# Patient Record
Sex: FEMALE | Race: WHITE | NOT HISPANIC OR LATINO | Employment: OTHER | URBAN - METROPOLITAN AREA
[De-identification: names, ages, dates, MRNs, and addresses within clinical notes are randomized per-mention and may not be internally consistent; named-entity substitution may affect disease eponyms.]

---

## 2018-08-15 ENCOUNTER — HOSPITAL ENCOUNTER (INPATIENT)
Facility: HOSPITAL | Age: 58
LOS: 4 days | Discharge: HOME | End: 2018-08-19
Attending: EMERGENCY MEDICINE | Admitting: HOSPITALIST

## 2018-08-15 ENCOUNTER — APPOINTMENT (OUTPATIENT)
Dept: CARDIOLOGY | Facility: HOSPITAL | Age: 58
End: 2018-08-15

## 2018-08-15 DIAGNOSIS — I73.9 PVD (PERIPHERAL VASCULAR DISEASE) (HCC): ICD-10-CM

## 2018-08-15 DIAGNOSIS — L03.115 CELLULITIS OF RIGHT LOWER EXTREMITY: Primary | ICD-10-CM

## 2018-08-15 PROBLEM — J44.9 COPD (CHRONIC OBSTRUCTIVE PULMONARY DISEASE) (HCC): Status: ACTIVE | Noted: 2018-08-15

## 2018-08-15 PROBLEM — E78.5 HYPERLIPIDEMIA: Status: ACTIVE | Noted: 2018-08-15

## 2018-08-15 PROBLEM — I10 HYPERTENSION: Status: ACTIVE | Noted: 2018-08-15

## 2018-08-15 PROBLEM — I25.10 CORONARY ARTERY DISEASE: Status: ACTIVE | Noted: 2018-08-15

## 2018-08-15 PROBLEM — F17.200 SMOKER: Status: ACTIVE | Noted: 2018-08-15

## 2018-08-15 PROBLEM — E11.9 DIABETES MELLITUS (HCC): Status: ACTIVE | Noted: 2018-08-15

## 2018-08-15 LAB
ALBUMIN SERPL-MCNC: 3.96 G/DL (ref 3.2–4.8)
ALBUMIN/GLOB SERPL: 1.3 G/DL (ref 1.5–2.5)
ALP SERPL-CCNC: 106 U/L (ref 25–100)
ALT SERPL W P-5'-P-CCNC: 17 U/L (ref 7–40)
ANION GAP SERPL CALCULATED.3IONS-SCNC: 11 MMOL/L (ref 3–11)
AST SERPL-CCNC: 12 U/L (ref 0–33)
BASOPHILS # BLD AUTO: 0.02 10*3/MM3 (ref 0–0.2)
BASOPHILS NFR BLD AUTO: 0.2 % (ref 0–1)
BH CV LOWER VASCULAR LEFT COMMON FEMORAL AUGMENT: NORMAL
BH CV LOWER VASCULAR LEFT COMMON FEMORAL COMPRESS: NORMAL
BH CV LOWER VASCULAR LEFT COMMON FEMORAL PHASIC: NORMAL
BH CV LOWER VASCULAR LEFT COMMON FEMORAL SPONT: NORMAL
BH CV LOWER VASCULAR RIGHT COMMON FEMORAL AUGMENT: NORMAL
BH CV LOWER VASCULAR RIGHT COMMON FEMORAL COMPRESS: NORMAL
BH CV LOWER VASCULAR RIGHT COMMON FEMORAL PHASIC: NORMAL
BH CV LOWER VASCULAR RIGHT COMMON FEMORAL SPONT: NORMAL
BH CV LOWER VASCULAR RIGHT DISTAL FEMORAL COMPRESS: NORMAL
BH CV LOWER VASCULAR RIGHT GASTRONEMIUS COMPRESS: NORMAL
BH CV LOWER VASCULAR RIGHT GREATER SAPH AK COMPRESS: NORMAL
BH CV LOWER VASCULAR RIGHT LESSER SAPH COMPRESS: NORMAL
BH CV LOWER VASCULAR RIGHT MID FEMORAL AUGMENT: NORMAL
BH CV LOWER VASCULAR RIGHT MID FEMORAL COMPRESS: NORMAL
BH CV LOWER VASCULAR RIGHT MID FEMORAL PHASIC: NORMAL
BH CV LOWER VASCULAR RIGHT MID FEMORAL SPONT: NORMAL
BH CV LOWER VASCULAR RIGHT PERONEAL COMPRESS: NORMAL
BH CV LOWER VASCULAR RIGHT POPLITEAL AUGMENT: NORMAL
BH CV LOWER VASCULAR RIGHT POPLITEAL COMPRESS: NORMAL
BH CV LOWER VASCULAR RIGHT POPLITEAL PHASIC: NORMAL
BH CV LOWER VASCULAR RIGHT POPLITEAL SPONT: NORMAL
BH CV LOWER VASCULAR RIGHT POSTERIOR TIBIAL COMPRESS: NORMAL
BH CV LOWER VASCULAR RIGHT PROXIMAL FEMORAL COMPRESS: NORMAL
BH CV LOWER VASCULAR RIGHT SAPHENOFEMORAL JUNCTION AUGMENT: NORMAL
BH CV LOWER VASCULAR RIGHT SAPHENOFEMORAL JUNCTION COMPRESS: NORMAL
BH CV LOWER VASCULAR RIGHT SAPHENOFEMORAL JUNCTION PHASIC: NORMAL
BH CV LOWER VASCULAR RIGHT SAPHENOFEMORAL JUNCTION SPONT: NORMAL
BILIRUB SERPL-MCNC: 0.3 MG/DL (ref 0.3–1.2)
BUN BLD-MCNC: 11 MG/DL (ref 9–23)
BUN/CREAT SERPL: 14.1 (ref 7–25)
CALCIUM SPEC-SCNC: 8.6 MG/DL (ref 8.7–10.4)
CHLORIDE SERPL-SCNC: 101 MMOL/L (ref 99–109)
CO2 SERPL-SCNC: 26 MMOL/L (ref 20–31)
CREAT BLD-MCNC: 0.78 MG/DL (ref 0.6–1.3)
CRP SERPL-MCNC: 22.08 MG/DL (ref 0–1)
DEPRECATED RDW RBC AUTO: 45 FL (ref 37–54)
EOSINOPHIL # BLD AUTO: 0.04 10*3/MM3 (ref 0–0.3)
EOSINOPHIL NFR BLD AUTO: 0.3 % (ref 0–3)
ERYTHROCYTE [DISTWIDTH] IN BLOOD BY AUTOMATED COUNT: 14.4 % (ref 11.3–14.5)
ERYTHROCYTE [SEDIMENTATION RATE] IN BLOOD: 87 MM/HR (ref 0–30)
GFR SERPL CREATININE-BSD FRML MDRD: 76 ML/MIN/1.73
GLOBULIN UR ELPH-MCNC: 2.9 GM/DL
GLUCOSE BLD-MCNC: 178 MG/DL (ref 70–100)
GLUCOSE BLDC GLUCOMTR-MCNC: 226 MG/DL (ref 70–130)
HCT VFR BLD AUTO: 43.7 % (ref 34.5–44)
HGB BLD-MCNC: 13.5 G/DL (ref 11.5–15.5)
HOLD SPECIMEN: NORMAL
HOLD SPECIMEN: NORMAL
IMM GRANULOCYTES # BLD: 0.03 10*3/MM3 (ref 0–0.03)
IMM GRANULOCYTES NFR BLD: 0.3 % (ref 0–0.6)
LYMPHOCYTES # BLD AUTO: 0.92 10*3/MM3 (ref 0.6–4.8)
LYMPHOCYTES NFR BLD AUTO: 7.7 % (ref 24–44)
MCH RBC QN AUTO: 26.4 PG (ref 27–31)
MCHC RBC AUTO-ENTMCNC: 30.9 G/DL (ref 32–36)
MCV RBC AUTO: 85.5 FL (ref 80–99)
MONOCYTES # BLD AUTO: 0.75 10*3/MM3 (ref 0–1)
MONOCYTES NFR BLD AUTO: 6.3 % (ref 0–12)
NEUTROPHILS # BLD AUTO: 10.25 10*3/MM3 (ref 1.5–8.3)
NEUTROPHILS NFR BLD AUTO: 85.5 % (ref 41–71)
PLATELET # BLD AUTO: 234 10*3/MM3 (ref 150–450)
PMV BLD AUTO: 10 FL (ref 6–12)
POTASSIUM BLD-SCNC: 3.2 MMOL/L (ref 3.5–5.5)
PROT SERPL-MCNC: 6.9 G/DL (ref 5.7–8.2)
RBC # BLD AUTO: 5.11 10*6/MM3 (ref 3.89–5.14)
SODIUM BLD-SCNC: 138 MMOL/L (ref 132–146)
WBC NRBC COR # BLD: 11.98 10*3/MM3 (ref 3.5–10.8)
WHOLE BLOOD HOLD SPECIMEN: NORMAL
WHOLE BLOOD HOLD SPECIMEN: NORMAL

## 2018-08-15 PROCEDURE — 99284 EMERGENCY DEPT VISIT MOD MDM: CPT

## 2018-08-15 PROCEDURE — 82962 GLUCOSE BLOOD TEST: CPT

## 2018-08-15 PROCEDURE — 25010000002 VANCOMYCIN 10 G RECONSTITUTED SOLUTION: Performed by: PHYSICIAN ASSISTANT

## 2018-08-15 PROCEDURE — 94799 UNLISTED PULMONARY SVC/PX: CPT

## 2018-08-15 PROCEDURE — 85025 COMPLETE CBC W/AUTO DIFF WBC: CPT | Performed by: EMERGENCY MEDICINE

## 2018-08-15 PROCEDURE — G0378 HOSPITAL OBSERVATION PER HR: HCPCS

## 2018-08-15 PROCEDURE — 86140 C-REACTIVE PROTEIN: CPT | Performed by: EMERGENCY MEDICINE

## 2018-08-15 PROCEDURE — 80053 COMPREHEN METABOLIC PANEL: CPT | Performed by: EMERGENCY MEDICINE

## 2018-08-15 PROCEDURE — 99223 1ST HOSP IP/OBS HIGH 75: CPT | Performed by: FAMILY MEDICINE

## 2018-08-15 PROCEDURE — 94640 AIRWAY INHALATION TREATMENT: CPT

## 2018-08-15 PROCEDURE — 93971 EXTREMITY STUDY: CPT | Performed by: INTERNAL MEDICINE

## 2018-08-15 PROCEDURE — 25010000002 CEFTRIAXONE PER 250 MG: Performed by: PHYSICIAN ASSISTANT

## 2018-08-15 PROCEDURE — 94760 N-INVAS EAR/PLS OXIMETRY 1: CPT

## 2018-08-15 PROCEDURE — 93971 EXTREMITY STUDY: CPT

## 2018-08-15 RX ORDER — NALOXONE HCL 0.4 MG/ML
0.4 VIAL (ML) INJECTION
Status: DISCONTINUED | OUTPATIENT
Start: 2018-08-15 | End: 2018-08-19 | Stop reason: HOSPADM

## 2018-08-15 RX ORDER — SODIUM CHLORIDE 0.9 % (FLUSH) 0.9 %
10 SYRINGE (ML) INJECTION AS NEEDED
Status: DISCONTINUED | OUTPATIENT
Start: 2018-08-15 | End: 2018-08-19 | Stop reason: HOSPADM

## 2018-08-15 RX ORDER — PRAVASTATIN SODIUM 20 MG
20 TABLET ORAL DAILY
Status: ON HOLD | COMMUNITY
End: 2018-08-19

## 2018-08-15 RX ORDER — ASPIRIN 81 MG/1
81 TABLET, CHEWABLE ORAL DAILY
COMMUNITY
End: 2018-08-15

## 2018-08-15 RX ORDER — IPRATROPIUM BROMIDE AND ALBUTEROL SULFATE 2.5; .5 MG/3ML; MG/3ML
3 SOLUTION RESPIRATORY (INHALATION) ONCE
Status: COMPLETED | OUTPATIENT
Start: 2018-08-15 | End: 2018-08-15

## 2018-08-15 RX ORDER — IPRATROPIUM BROMIDE AND ALBUTEROL SULFATE 2.5; .5 MG/3ML; MG/3ML
3 SOLUTION RESPIRATORY (INHALATION) EVERY 4 HOURS PRN
COMMUNITY

## 2018-08-15 RX ORDER — LISINOPRIL 10 MG/1
10 TABLET ORAL DAILY
Status: DISCONTINUED | OUTPATIENT
Start: 2018-08-16 | End: 2018-08-19 | Stop reason: HOSPADM

## 2018-08-15 RX ORDER — CETIRIZINE HYDROCHLORIDE 10 MG/1
5 TABLET ORAL DAILY
Status: DISCONTINUED | OUTPATIENT
Start: 2018-08-16 | End: 2018-08-19 | Stop reason: HOSPADM

## 2018-08-15 RX ORDER — POTASSIUM CHLORIDE 7.45 MG/ML
10 INJECTION INTRAVENOUS
Status: DISCONTINUED | OUTPATIENT
Start: 2018-08-15 | End: 2018-08-19 | Stop reason: HOSPADM

## 2018-08-15 RX ORDER — OXYBUTYNIN CHLORIDE 5 MG/1
5 TABLET, EXTENDED RELEASE ORAL DAILY
Status: DISCONTINUED | OUTPATIENT
Start: 2018-08-16 | End: 2018-08-19 | Stop reason: HOSPADM

## 2018-08-15 RX ORDER — ASPIRIN 325 MG
325 TABLET ORAL DAILY
COMMUNITY
End: 2019-05-01

## 2018-08-15 RX ORDER — NICOTINE 21 MG/24HR
1 PATCH, TRANSDERMAL 24 HOURS TRANSDERMAL ONCE
Status: COMPLETED | OUTPATIENT
Start: 2018-08-15 | End: 2018-08-16

## 2018-08-15 RX ORDER — CEFTRIAXONE SODIUM 1 G/50ML
1 INJECTION, SOLUTION INTRAVENOUS ONCE
Status: COMPLETED | OUTPATIENT
Start: 2018-08-15 | End: 2018-08-15

## 2018-08-15 RX ORDER — BUDESONIDE AND FORMOTEROL FUMARATE DIHYDRATE 160; 4.5 UG/1; UG/1
2 AEROSOL RESPIRATORY (INHALATION)
Status: DISCONTINUED | OUTPATIENT
Start: 2018-08-15 | End: 2018-08-19 | Stop reason: HOSPADM

## 2018-08-15 RX ORDER — HYDROCHLOROTHIAZIDE 25 MG/1
25 TABLET ORAL DAILY
Status: ON HOLD | COMMUNITY
End: 2018-08-19

## 2018-08-15 RX ORDER — MORPHINE SULFATE 2 MG/ML
1 INJECTION, SOLUTION INTRAMUSCULAR; INTRAVENOUS EVERY 4 HOURS PRN
Status: DISCONTINUED | OUTPATIENT
Start: 2018-08-15 | End: 2018-08-19 | Stop reason: HOSPADM

## 2018-08-15 RX ORDER — NICOTINE 21 MG/24HR
1 PATCH, TRANSDERMAL 24 HOURS TRANSDERMAL
Status: DISCONTINUED | OUTPATIENT
Start: 2018-08-16 | End: 2018-08-19 | Stop reason: HOSPADM

## 2018-08-15 RX ORDER — SODIUM CHLORIDE 0.9 % (FLUSH) 0.9 %
1-10 SYRINGE (ML) INJECTION AS NEEDED
Status: DISCONTINUED | OUTPATIENT
Start: 2018-08-15 | End: 2018-08-19 | Stop reason: HOSPADM

## 2018-08-15 RX ORDER — ONDANSETRON 2 MG/ML
4 INJECTION INTRAMUSCULAR; INTRAVENOUS EVERY 6 HOURS PRN
Status: DISCONTINUED | OUTPATIENT
Start: 2018-08-15 | End: 2018-08-19 | Stop reason: HOSPADM

## 2018-08-15 RX ORDER — TRAMADOL HYDROCHLORIDE 50 MG/1
50 TABLET ORAL EVERY 6 HOURS PRN
Status: DISCONTINUED | OUTPATIENT
Start: 2018-08-15 | End: 2018-08-19 | Stop reason: HOSPADM

## 2018-08-15 RX ORDER — CEFTRIAXONE SODIUM 1 G/50ML
1 INJECTION, SOLUTION INTRAVENOUS EVERY 24 HOURS
Status: DISCONTINUED | OUTPATIENT
Start: 2018-08-16 | End: 2018-08-18 | Stop reason: DRUGHIGH

## 2018-08-15 RX ORDER — ASPIRIN 81 MG/1
81 TABLET, CHEWABLE ORAL DAILY
Status: DISCONTINUED | OUTPATIENT
Start: 2018-08-16 | End: 2018-08-19 | Stop reason: HOSPADM

## 2018-08-15 RX ORDER — ATORVASTATIN CALCIUM 10 MG/1
10 TABLET, FILM COATED ORAL NIGHTLY
Status: DISCONTINUED | OUTPATIENT
Start: 2018-08-15 | End: 2018-08-19 | Stop reason: HOSPADM

## 2018-08-15 RX ORDER — POTASSIUM CHLORIDE 1.5 G/1.77G
40 POWDER, FOR SOLUTION ORAL AS NEEDED
Status: DISCONTINUED | OUTPATIENT
Start: 2018-08-15 | End: 2018-08-19 | Stop reason: HOSPADM

## 2018-08-15 RX ORDER — HYDROCHLOROTHIAZIDE 25 MG/1
25 TABLET ORAL DAILY
Status: DISCONTINUED | OUTPATIENT
Start: 2018-08-16 | End: 2018-08-19 | Stop reason: HOSPADM

## 2018-08-15 RX ORDER — DEXTROSE MONOHYDRATE 25 G/50ML
25 INJECTION, SOLUTION INTRAVENOUS
Status: DISCONTINUED | OUTPATIENT
Start: 2018-08-15 | End: 2018-08-19 | Stop reason: HOSPADM

## 2018-08-15 RX ORDER — POTASSIUM CHLORIDE 750 MG/1
40 CAPSULE, EXTENDED RELEASE ORAL AS NEEDED
Status: DISCONTINUED | OUTPATIENT
Start: 2018-08-15 | End: 2018-08-19 | Stop reason: HOSPADM

## 2018-08-15 RX ORDER — CETIRIZINE HYDROCHLORIDE 5 MG/1
5 TABLET ORAL DAILY
COMMUNITY
End: 2019-05-01

## 2018-08-15 RX ORDER — ALBUTEROL SULFATE 2.5 MG/3ML
2.5 SOLUTION RESPIRATORY (INHALATION) EVERY 4 HOURS PRN
COMMUNITY

## 2018-08-15 RX ORDER — ACETAMINOPHEN 325 MG/1
650 TABLET ORAL EVERY 4 HOURS PRN
Status: DISCONTINUED | OUTPATIENT
Start: 2018-08-15 | End: 2018-08-19 | Stop reason: HOSPADM

## 2018-08-15 RX ORDER — HYDROCODONE BITARTRATE AND ACETAMINOPHEN 5; 325 MG/1; MG/1
1 TABLET ORAL ONCE
Status: COMPLETED | OUTPATIENT
Start: 2018-08-15 | End: 2018-08-15

## 2018-08-15 RX ORDER — NICOTINE POLACRILEX 4 MG
15 LOZENGE BUCCAL
Status: DISCONTINUED | OUTPATIENT
Start: 2018-08-15 | End: 2018-08-19 | Stop reason: HOSPADM

## 2018-08-15 RX ORDER — LISINOPRIL 10 MG/1
10 TABLET ORAL DAILY
COMMUNITY

## 2018-08-15 RX ADMIN — TRAMADOL HYDROCHLORIDE 50 MG: 50 TABLET, COATED ORAL at 21:37

## 2018-08-15 RX ADMIN — BUDESONIDE AND FORMOTEROL FUMARATE DIHYDRATE 2 PUFF: 160; 4.5 AEROSOL RESPIRATORY (INHALATION) at 21:28

## 2018-08-15 RX ADMIN — POTASSIUM CHLORIDE 40 MEQ: 750 CAPSULE, EXTENDED RELEASE ORAL at 21:36

## 2018-08-15 RX ADMIN — ATORVASTATIN CALCIUM 10 MG: 10 TABLET, FILM COATED ORAL at 21:20

## 2018-08-15 RX ADMIN — VANCOMYCIN HYDROCHLORIDE 1500 MG: 10 INJECTION, POWDER, LYOPHILIZED, FOR SOLUTION INTRAVENOUS at 17:46

## 2018-08-15 RX ADMIN — NICOTINE 1 PATCH: 21 PATCH, EXTENDED RELEASE TRANSDERMAL at 21:36

## 2018-08-15 RX ADMIN — HYDROCODONE BITARTRATE AND ACETAMINOPHEN 1 TABLET: 5; 325 TABLET ORAL at 17:32

## 2018-08-15 RX ADMIN — IPRATROPIUM BROMIDE AND ALBUTEROL SULFATE 3 ML: 2.5; .5 SOLUTION RESPIRATORY (INHALATION) at 17:15

## 2018-08-15 RX ADMIN — CEFTRIAXONE SODIUM 1 G: 1 INJECTION, SOLUTION INTRAVENOUS at 17:33

## 2018-08-15 NOTE — ED NOTES
RAINBOW DRAWN AT TRIAGE.  LACTIC ACID DRAWN AND SENT TO LAB TO HOLD. ONE BLOOD CULTURE DRAWN AND SENT TO LAB TO HOLD.     Uziel Poon  08/15/18 0672

## 2018-08-15 NOTE — ED PROVIDER NOTES
Subjective   Patient comes emergency Department today complaining of having increasing pain and redness in the right lower extremity.  Patient has a history of previous sialitis the same lower extremity.  She had a femoropopliteal bypass several years ago.  Patient reports that about 2 days ago shows that she is having some swelling later that evening she noticed a little bit of redness yesterday no surrounding this was about correction up her shin and then today up to her knee.  She's had no fevers no chills his had myalgias.  She states she's had no recent trauma to the lower extremity.  Patient states that that she she is seen infectious disease in the past for this but his primary been about a year.  Patient reports that she has not seen anybody else for this condition.  Elevation of does seem to decrease some redness and pain and swelling.        History provided by:  Patient   used: No    Lower Extremity Issue   Location:  Leg  Time since incident:  3 days  Injury: no    Leg location:  R lower leg  Pain details:     Quality:  Burning, aching and pressure    Radiates to:  Does not radiate    Severity:  Severe    Onset quality:  Gradual    Duration:  3 days    Timing:  Constant    Progression:  Worsening  Chronicity:  New  Dislocation: no    Foreign body present:  No foreign bodies  Tetanus status:  Up to date  Prior injury to area:  No  Relieved by:  Elevation  Worsened by:  Bearing weight  Ineffective treatments:  None tried  Associated symptoms: stiffness and swelling    Associated symptoms: no back pain, no decreased ROM and no fever        Review of Systems   Constitutional: Negative for chills and fever.   Respiratory: Negative for chest tightness, shortness of breath and wheezing.    Cardiovascular: Positive for leg swelling. Negative for chest pain and palpitations.   Gastrointestinal: Negative for diarrhea, nausea and vomiting.   Genitourinary: Negative for dysuria, frequency and  urgency.   Musculoskeletal: Positive for stiffness. Negative for back pain.   Neurological: Negative for dizziness, seizures and weakness.   Hematological: Positive for adenopathy.   Psychiatric/Behavioral: Negative.    All other systems reviewed and are negative.      Past Medical History:   Diagnosis Date   • Cancer (CMS/HCC)     skin   • COPD (chronic obstructive pulmonary disease) (CMS/HCC)    • Coronary artery disease    • Diabetes mellitus (CMS/HCC)    • Hyperlipidemia    • Hypertension        Allergies   Allergen Reactions   • Propoxyphene-Acetaminophen Nausea And Vomiting       Past Surgical History:   Procedure Laterality Date   • APPENDECTOMY     • CARDIAC SURGERY     • CHOLECYSTECTOMY     • COLONOSCOPY     • EYE SURGERY     • HYSTERECTOMY     • SKIN BIOPSY     • TUBAL ABDOMINAL LIGATION     • VASCULAR SURGERY         History reviewed. No pertinent family history.    Social History     Social History   • Marital status: Single     Social History Main Topics   • Smoking status: Current Every Day Smoker     Types: Cigarettes   • Alcohol use No   • Drug use: Yes     Types: Marijuana     Other Topics Concern   • Not on file           Objective   Physical Exam   Constitutional: She is oriented to person, place, and time.   HENT:   Head: Normocephalic and atraumatic.   Nose: Nose normal.   Eyes: Conjunctivae are normal. No scleral icterus.   Neck: Normal range of motion. No thyromegaly present.   Cardiovascular: Normal rate, regular rhythm and normal heart sounds.    Pulmonary/Chest: Effort normal and breath sounds normal. No respiratory distress. She has no wheezes. She has no rales. She exhibits no tenderness.   Abdominal: Soft. Bowel sounds are normal. She exhibits no distension. There is no tenderness.   Musculoskeletal: Normal range of motion.   Lymphadenopathy:     She has no cervical adenopathy.   Neurological: She is alert and oriented to person, place, and time. She has normal reflexes. She displays  normal reflexes. No cranial nerve deficit. Coordination normal.   Skin: Skin is warm and dry. Capillary refill takes less than 2 seconds.   Skin on the right lower extremity is red it's warm to touch it's a deep redness there appears to be no open wounds is an old scar from a previous injury.  Her posterior tibialis dorsalis pedis pulses were dopplerable.  Cap refill less than 2 seconds.  This rash is circumferential it's more severe anteriorly.  She has regional lymphadenitis the right upper leg.   Psychiatric: She has a normal mood and affect. Her behavior is normal. Judgment and thought content normal.   Nursing note and vitals reviewed.      Procedures           ED Course                  MDM  Number of Diagnoses or Management Options  Cellulitis of right lower extremity: new and requires workup  PVD (peripheral vascular disease) (CMS/HCC): new and requires workup     Amount and/or Complexity of Data Reviewed  Clinical lab tests: reviewed and ordered  Tests in the radiology section of CPT®: reviewed and ordered  Discuss the patient with other providers: yes    Patient Progress  Patient progress: stable        Final diagnoses:   Cellulitis of right lower extremity   PVD (peripheral vascular disease) (CMS/HCC)            Shukri Hinson PA  08/16/18 8848

## 2018-08-16 LAB
GLUCOSE BLDC GLUCOMTR-MCNC: 123 MG/DL (ref 70–130)
GLUCOSE BLDC GLUCOMTR-MCNC: 154 MG/DL (ref 70–130)
GLUCOSE BLDC GLUCOMTR-MCNC: 161 MG/DL (ref 70–130)
GLUCOSE BLDC GLUCOMTR-MCNC: 197 MG/DL (ref 70–130)
GLUCOSE BLDC GLUCOMTR-MCNC: 198 MG/DL (ref 70–130)
HBA1C MFR BLD: 6.9 % (ref 4.8–5.6)
POTASSIUM BLD-SCNC: 3.7 MMOL/L (ref 3.5–5.5)

## 2018-08-16 PROCEDURE — 94799 UNLISTED PULMONARY SVC/PX: CPT

## 2018-08-16 PROCEDURE — 25010000002 ENOXAPARIN PER 10 MG: Performed by: FAMILY MEDICINE

## 2018-08-16 PROCEDURE — 25010000002 ONDANSETRON PER 1 MG: Performed by: FAMILY MEDICINE

## 2018-08-16 PROCEDURE — 94760 N-INVAS EAR/PLS OXIMETRY 1: CPT

## 2018-08-16 PROCEDURE — 25010000002 VANCOMYCIN 10 G RECONSTITUTED SOLUTION

## 2018-08-16 PROCEDURE — 84132 ASSAY OF SERUM POTASSIUM: CPT | Performed by: FAMILY MEDICINE

## 2018-08-16 PROCEDURE — 99233 SBSQ HOSP IP/OBS HIGH 50: CPT | Performed by: HOSPITALIST

## 2018-08-16 PROCEDURE — 82962 GLUCOSE BLOOD TEST: CPT

## 2018-08-16 PROCEDURE — 94640 AIRWAY INHALATION TREATMENT: CPT

## 2018-08-16 PROCEDURE — 25010000002 CEFTRIAXONE PER 250 MG: Performed by: FAMILY MEDICINE

## 2018-08-16 PROCEDURE — 83036 HEMOGLOBIN GLYCOSYLATED A1C: CPT | Performed by: HOSPITALIST

## 2018-08-16 PROCEDURE — 87040 BLOOD CULTURE FOR BACTERIA: CPT | Performed by: PHYSICIAN ASSISTANT

## 2018-08-16 PROCEDURE — 63710000001 PROMETHAZINE PER 12.5 MG: Performed by: HOSPITALIST

## 2018-08-16 RX ORDER — IBUPROFEN 600 MG/1
600 TABLET ORAL EVERY 6 HOURS PRN
Status: DISCONTINUED | OUTPATIENT
Start: 2018-08-16 | End: 2018-08-19 | Stop reason: HOSPADM

## 2018-08-16 RX ORDER — PROMETHAZINE HYDROCHLORIDE 12.5 MG/1
12.5 TABLET ORAL EVERY 6 HOURS PRN
Status: DISCONTINUED | OUTPATIENT
Start: 2018-08-16 | End: 2018-08-19 | Stop reason: HOSPADM

## 2018-08-16 RX ORDER — PROMETHAZINE HYDROCHLORIDE 25 MG/ML
12.5 INJECTION, SOLUTION INTRAMUSCULAR; INTRAVENOUS EVERY 6 HOURS PRN
Status: DISCONTINUED | OUTPATIENT
Start: 2018-08-16 | End: 2018-08-19 | Stop reason: HOSPADM

## 2018-08-16 RX ORDER — LINEZOLID 2 MG/ML
600 INJECTION, SOLUTION INTRAVENOUS EVERY 12 HOURS
Status: DISCONTINUED | OUTPATIENT
Start: 2018-08-16 | End: 2018-08-16

## 2018-08-16 RX ADMIN — ONDANSETRON 4 MG: 2 INJECTION, SOLUTION INTRAMUSCULAR; INTRAVENOUS at 09:54

## 2018-08-16 RX ADMIN — HYDROCHLOROTHIAZIDE 25 MG: 25 TABLET ORAL at 09:40

## 2018-08-16 RX ADMIN — ATORVASTATIN CALCIUM 10 MG: 10 TABLET, FILM COATED ORAL at 20:03

## 2018-08-16 RX ADMIN — VANCOMYCIN HYDROCHLORIDE 1250 MG: 10 INJECTION, POWDER, LYOPHILIZED, FOR SOLUTION INTRAVENOUS at 09:41

## 2018-08-16 RX ADMIN — ACETAMINOPHEN 650 MG: 325 TABLET ORAL at 03:27

## 2018-08-16 RX ADMIN — ASPIRIN 81 MG CHEWABLE TABLET 81 MG: 81 TABLET CHEWABLE at 09:54

## 2018-08-16 RX ADMIN — CEFTRIAXONE SODIUM 1 G: 1 INJECTION, SOLUTION INTRAVENOUS at 16:35

## 2018-08-16 RX ADMIN — ENOXAPARIN SODIUM 40 MG: 40 INJECTION SUBCUTANEOUS at 09:40

## 2018-08-16 RX ADMIN — ONDANSETRON 4 MG: 2 INJECTION, SOLUTION INTRAMUSCULAR; INTRAVENOUS at 03:27

## 2018-08-16 RX ADMIN — TRAMADOL HYDROCHLORIDE 50 MG: 50 TABLET, COATED ORAL at 05:37

## 2018-08-16 RX ADMIN — OXYBUTYNIN CHLORIDE 5 MG: 5 TABLET, EXTENDED RELEASE ORAL at 09:40

## 2018-08-16 RX ADMIN — IBUPROFEN 600 MG: 600 TABLET ORAL at 11:18

## 2018-08-16 RX ADMIN — IBUPROFEN 600 MG: 600 TABLET ORAL at 20:03

## 2018-08-16 RX ADMIN — CETIRIZINE HYDROCHLORIDE 5 MG: 10 TABLET, FILM COATED ORAL at 09:40

## 2018-08-16 RX ADMIN — NICOTINE 1 PATCH: 21 PATCH, EXTENDED RELEASE TRANSDERMAL at 09:41

## 2018-08-16 RX ADMIN — BUDESONIDE AND FORMOTEROL FUMARATE DIHYDRATE 2 PUFF: 160; 4.5 AEROSOL RESPIRATORY (INHALATION) at 09:35

## 2018-08-16 RX ADMIN — LISINOPRIL 10 MG: 10 TABLET ORAL at 09:40

## 2018-08-16 RX ADMIN — PROMETHAZINE HYDROCHLORIDE 12.5 MG: 12.5 TABLET ORAL at 11:18

## 2018-08-16 NOTE — H&P
[facility] Medicine Services  HISTORY AND PHYSICAL    Patient Name: Gladys Iglesias  : 1960  MRN: [id number]  Primary Care Physician: Provider, No Known    Subjective   Subjective     Chief Complaint:  Right lower extremity cellulitis    HPI:  Gladys Iglesias is a 58 y.o. female with a past medical history of hypertension, dyslipidemia, COPD with ongoing smoking, CAD, DM 2.   presents with 2 days of rapidly progressing right lower extremity cellulitis.  She has had previous remote episodes of right lower extremity cellulitis associated with prior wounds for which she has scars on the distal pretibial areas.  She states that she was standing on her feet for longer than usual 2 days ago while waiting in line at an office, when she got home her right lower extremity was burning and noted to be more edematous than the left.  Patient states that she elevated the leg as she has had previous experience with edema and erythema in her lower extremities, but this did not help her symptoms.  She awoke the next day with bright tender erythema initially lacy but as the day continued became more confluent and angry appearing.  She came to the ED for further evaluation stating that pain was so severe she could not bear weight on the right lower extremity and the edema had become markedly increased in a short amount of time.    In [facility] ED, patient is found to have a white count of 11.98, elevated CRP, negative duplex for DVT.    Review of Systems   Otherwise 10-system ROS reviewed and is negative except as mentioned in the HPI.    Personal History     Past Medical History:   Diagnosis Date   • Cancer (CMS/HCC)     skin   • COPD (chronic obstructive pulmonary disease) (CMS/HCC)    • Coronary artery disease    • Diabetes mellitus (CMS/HCC)    • Hyperlipidemia    • Hypertension        Past Surgical History:   Procedure Laterality Date   • APPENDECTOMY     • CARDIAC SURGERY     • CHOLECYSTECTOMY     •  COLONOSCOPY     • EYE SURGERY     • HYSTERECTOMY     • SKIN BIOPSY     • TUBAL ABDOMINAL LIGATION     • VASCULAR SURGERY         Family History: family history is not on file.     Social History:  reports that she has been smoking Cigarettes.  She does not have any smokeless tobacco history on file. She reports that she uses drugs, including Marijuana. She reports that she does not drink alcohol.  Social History     Social History Narrative   • No narrative on file       Medications:  Prescriptions Prior to Admission   Medication Sig Dispense Refill Last Dose   • albuterol (PROVENTIL) (2.5 MG/3ML) 0.083% nebulizer solution Take 2.5 mg by nebulization Every 4 (Four) Hours As Needed for Wheezing.      • aspirin 325 MG tablet Take 325 mg by mouth Daily.      • cetirizine (zyrTEC) 5 MG tablet Take 5 mg by mouth Daily.      • fluticasone-salmeterol (ADVAIR) 250-50 MCG/DOSE DISKUS Inhale 2 (Two) Times a Day.      • hydrochlorothiazide (HYDRODIURIL) 25 MG tablet Take 25 mg by mouth Daily.      • ipratropium-albuterol (DUO-NEB) 0.5-2.5 mg/3 ml nebulizer Take 3 mL by nebulization Every 4 (Four) Hours As Needed for Wheezing.      • lisinopril (PRINIVIL,ZESTRIL) 10 MG tablet Take 10 mg by mouth Daily.      • metFORMIN (GLUCOPHAGE) 500 MG tablet Take 500 mg by mouth Daily With Breakfast.      • pravastatin (PRAVACHOL) 20 MG tablet Take 20 mg by mouth Daily.      • tiotropium (SPIRIVA) 18 MCG per inhalation capsule Place 1 capsule into inhaler and inhale Daily.          Allergies   Allergen Reactions   • Propoxyphene-Acetaminophen Nausea And Vomiting       Objective   Objective     Vital Signs:   Temp:  [98.5 °F (36.9 °C)-98.7 °F (37.1 °C)] 98.6 °F (37 °C)  Heart Rate:  [] 98  Resp:  [20-22] 20  BP: (120-161)/(66-91) 120/75        Physical Exam   Constitutional: No acute distress, awake, alert, nontoxic, obese body habitus  HENT: NCAT, mucous membranes moist, poor dentition  Respiratory: Clear to auscultation bilaterally,  good effort, nonlabored respirations   Cardiovascular: RRR, no murmur  Musculoskeletal: RLE  +2 pitting to knee, normal muscle tone for age  Psychiatric: Appropriate affect, good insight and judgement, cooperative  Skin: Angry confluent bright red erythematous cellulitis of right lower extremity circumferential around the ankle and spreading confluently up the pretibial area nearly to the knee.  Margins are marked with sharpie pen for future comparison.      Results Reviewed:  I have personally reviewed current lab, radiology, and data and agree.      Results from last 7 days  Lab Units 08/15/18  1357   WBC 10*3/mm3 11.98*   HEMOGLOBIN g/dL 13.5   HEMATOCRIT % 43.7   PLATELETS 10*3/mm3 234       Results from last 7 days  Lab Units 08/15/18  1356   SODIUM mmol/L 138   POTASSIUM mmol/L 3.2*   CHLORIDE mmol/L 101   CO2 mmol/L 26.0   BUN mg/dL 11   CREATININE mg/dL 0.78   GLUCOSE mg/dL 178*   CALCIUM mg/dL 8.6*   ALT (SGPT) U/L 17   AST (SGOT) U/L 12     Estimated Creatinine Clearance: 73.7 mL/min (by C-G formula based on SCr of 0.78 mg/dL).  Brief Urine Lab Results     None           Assessment/Plan   Assessment / Plan     Hospital Problem List     Cellulitis of right lower extremity    COPD (chronic obstructive pulmonary disease) (CMS/[facility])    Diabetes mellitus (CMS/[facility])    Coronary artery disease    Hyperlipidemia    Hypertension    Smoker            Assessment & Plan:  Please see above Hospital Problem List which is being actively managed to reflect all current/pertinent diagnoses, the following items may only represent the diagnoses most acutely being managed at time of admission.    RLE cellulitis  - Continue Vanc and Rocephin, suspect will need prolonged IV course due to severity of cellulitis and edema  - ID consult  - Elevate leg when at rest    DM 2  - Hold home oral hypoglycemics  - SSI and low dose basal    Smoker  - Nicotine patch    COPD, hyperlipidemia, hypertension  - Stable chronic conditions  - Continue  home meds as indicated      DVT prophylaxis: Lovenox    CODE STATUS:    Code Status and Medical Interventions:   Ordered at: 08/15/18 1845     Code Status:    CPR     Medical Interventions (Level of Support Prior to Arrest):    Full       Admission Status:  I believe this patient meets INPATIENT status due to the need for care which can only be reasonably provided in an hospital setting such as aggressive/expedited ancillary services and/or consultation services, the necessity for IV antibiotics medications, close physician monitoring.  In such, I feel patient’s risk for adverse outcomes and need for care warrant INPATIENT evaluation and predict the patient’s care encounter to likely last beyond 2 midnights.        Electronically signed by Rosaura Jeronimo MD, 08/15/18, 11:14 PM.

## 2018-08-16 NOTE — PROGRESS NOTES
[facility] Medicine Services  PROGRESS NOTE    Patient Name: Gladys Iglesias  : 1960  MRN: [id number]    Date of Admission: 8/15/2018  Length of Stay: 1  Primary Care Physician: Provider, No Known    Subjective   Subjective     CC:  F/U RLE cellulitis    HPI:  Patient seen this morning. No major complaints. Hoping not to stay here for too long. Right leg still red and swollen. Says her dog bit her a few days ago on the top of her great toe.    Review of Systems  Gen-no fevers, no chills  CV-no chest pain, no palpitations  Resp-no cough, no dyspnea  GI-no N/V/D, no abd pain    Otherwise ROS is negative except as mentioned in the HPI.    Objective   Objective     Vital Signs:   Temp:  [97.6 °F (36.4 °C)-98.6 °F (37 °C)] 97.9 °F (36.6 °C)  Heart Rate:  [] 88  Resp:  [18-22] 18  BP: (120-169)/(66-91) 123/72        Physical Exam:  Gen-no acute distress  CV-RRR, S1 S2 normal, no m/r/g  Resp-CTAB, no wheezes  Abd-soft, NT, ND, +BS  Ext-RLE with erythema and warmth from ankle to below knee; no visible wound on great toe where patient states dog bit her  Neuro-A&Ox3, no focal deficits  Psych-appropriate mood    Results Reviewed:  I have personally reviewed current lab, radiology, and data and agree.      Results from last 7 days  Lab Units 08/15/18  1357   WBC 10*3/mm3 11.98*   HEMOGLOBIN g/dL 13.5   HEMATOCRIT % 43.7   PLATELETS 10*3/mm3 234       Results from last 7 days  Lab Units 18  0445 08/15/18  1356   SODIUM mmol/L  --  138   POTASSIUM mmol/L 3.7 3.2*   CHLORIDE mmol/L  --  101   CO2 mmol/L  --  26.0   BUN mg/dL  --  11   CREATININE mg/dL  --  0.78   GLUCOSE mg/dL  --  178*   CALCIUM mg/dL  --  8.6*   ALT (SGPT) U/L  --  17   AST (SGOT) U/L  --  12     Estimated Creatinine Clearance: 73.7 mL/min (by C-G formula based on SCr of 0.78 mg/dL).  No results found for: BNP    Microbiology Results Abnormal     None          Imaging Results (last 24 hours)     ** No results found  for the last 24 hours. **             I have reviewed the medications.      aspirin 81 mg Oral Daily   atorvastatin 10 mg Oral Nightly   budesonide-formoterol 2 puff Inhalation BID - RT   ceftriaxone 1 g Intravenous Q24H   cetirizine 5 mg Oral Daily   enoxaparin 40 mg Subcutaneous Q24H   hydrochlorothiazide 25 mg Oral Daily   insulin detemir 8 Units Subcutaneous Nightly   insulin lispro 0-7 Units Subcutaneous 4x Daily With Meals & Nightly   Linezolid 600 mg Intravenous Q12H   lisinopril 10 mg Oral Daily   nicotine 1 patch Transdermal Q24H   nicotine 1 patch Transdermal Once   oxybutynin XL 5 mg Oral Daily         Assessment/Plan   Assessment / Plan     Hospital Problem List     * (Principal)Cellulitis of right lower extremity    COPD (chronic obstructive pulmonary disease) (CMS/[facility])    Diabetes mellitus (CMS/[facility])    Coronary artery disease    Hyperlipidemia    Hypertension    Smoker             Brief Hospital Course to date:  Gladys Iglesias is a 58 y.o. female with hx of DM2, HTN, HLD, COPD, and prior episodes of leg cellulitis presents due to redness, swelling, and pain in her right lower extremity.      Assessment & Plan:  --Duplex negative for DVT.  --Continue Vanc and Rocephin. ID consulted.   --Labs in AM.    DVT Prophylaxis:  Lovenox    CODE STATUS:   Code Status and Medical Interventions:   Ordered at: 08/15/18 6470     Code Status:    CPR     Medical Interventions (Level of Support Prior to Arrest):    Full       Disposition: I expect the patient to be discharged home in ~2 days    Electronically signed by Nena Pham MD, 08/16/18, 3:09 PM.

## 2018-08-16 NOTE — NURSING NOTE
Patient refused to take insulin this evening when offered. Patient states everytime shes tried insulin her sugar bottoms out and that she is sensitive to it. Informed aprn when she was rounding. She stated to recheck her sugar at 0200 and then go from there.

## 2018-08-16 NOTE — PROGRESS NOTES
Discharge Planning Assessment  Psychiatric     Patient Name: Gladys Iglesias  MRN: [id number]  Today's Date: 8/16/2018    Admit Date: 8/15/2018          Discharge Needs Assessment     Row Name 08/16/18 1621       Living Environment    Lives With spouse    Current Living Arrangements home/apartment/condo    Primary Care Provided by self    Quality of Family Relationships supportive       Resource/Environmental Concerns    Resource/Environmental Concerns none       Transition Planning    Patient/Family Anticipates Transition to home with family    Patient/Family Anticipated Services at Transition none       Discharge Needs Assessment    Readmission Within the Last 30 Days no previous admission in last 30 days    Concerns to be Addressed no discharge needs identified    Equipment Currently Used at Home none    Anticipated Changes Related to Illness none    Equipment Needed After Discharge none            Discharge Plan     Row Name 08/16/18 1622       Plan    Plan Gladys Iglesias lives in [place] with her spouse Ludin Iglesias, [phone] and she has a goal of being able to return to her home when she is discharged from the hospital. Casemanagement will continue to follow.    Patient/Family in Agreement with Plan yes    Final Discharge Disposition Code 01 - home or self-care        Destination     No service coordination in this encounter.      Durable Medical Equipment     No service coordination in this encounter.      Dialysis/Infusion     No service coordination in this encounter.      Home Medical Care     No service coordination in this encounter.      Social Care     No service coordination in this encounter.                Demographic Summary     Row Name 08/16/18 1621       General Information    Admission Type inpatient            Functional Status     Row Name 08/16/18 1621       Functional Status    Usual Activity Tolerance moderate    Current Activity Tolerance moderate       Functional Status, IADL     Medications independent    Meal Preparation independent    Housekeeping independent    Laundry independent    Shopping independent            Psychosocial    No documentation.           Abuse/Neglect    No documentation.           Legal    No documentation.           Substance Abuse    No documentation.           Patient Forms    No documentation.         JOSE MANUEL Callahan

## 2018-08-16 NOTE — PAYOR COMM NOTE
"Gladys Vega (58 y.o. Female)   Id # [id number]  Julia Jolly RN, BSN  Phone # [phone]  Fax # [phone]    Date of Birth Social Security Number Address Home Phone MRN    1960  [address]  [facility] [address] [phone] [id number]    [employer] Status          None Single       Admission Date Admission Type Admitting Provider Attending Provider Department, Room/Bed    8/15/18 Emergency Nena Pham MD Reddy, Mayuri V, MD [facility] 2G, S224/1    Discharge Date Discharge Disposition Discharge Destination                       Attending Provider:  Nena Pham MD    Allergies:  Propoxyphene-acetaminophen    Isolation:  None   Infection:  None   Code Status:  CPR    Ht:  152.4 cm (60\")   Wt:  78.6 kg (173 lb 4.8 oz)    Admission Cmt:  None   Principal Problem:  None                Active Insurance as of 8/15/2018     Primary Coverage     Payor Plan Insurance Group Employer/Plan Group    [employer] [place] [employer] [place]ICA[place]      Payor Plan Address Payor Plan Phone Number Effective From Effective To    [address] [address] [phone] 8/15/2018     [facility] [id number]       Subscriber Name Subscriber Birth Date Member ID       GLADYS VEGA 1960 [id number]                 Emergency Contacts      (Rel.) Home Phone Work Phone Mobile Phone    Ludin Vega (Son) [phone] -- --               History & Physical      Rosaura Jeronimo MD at 8/15/2018 11:14 PM              [facility] Medicine Services  HISTORY AND PHYSICAL    Patient Name: Gladys Vega  : 1960  MRN: [id number]  Primary Care Physician: Provider, No Known    Subjective   Subjective     Chief Complaint:  Right lower extremity cellulitis    HPI:  Gladys Vega is a 58 y.o. female with a past medical history of hypertension, dyslipidemia, COPD with ongoing smoking, CAD, DM 2.   presents with 2 days of rapidly progressing right lower extremity cellulitis.  She has had " previous remote episodes of right lower extremity cellulitis associated with prior wounds for which she has scars on the distal pretibial areas.  She states that she was standing on her feet for longer than usual 2 days ago while waiting in line at an office, when she got home her right lower extremity was burning and noted to be more edematous than the left.  Patient states that she elevated the leg as she has had previous experience with edema and erythema in her lower extremities, but this did not help her symptoms.  She awoke the next day with bright tender erythema initially lacy but as the day continued became more confluent and angry appearing.  She came to the ED for further evaluation stating that pain was so severe she could not bear weight on the right lower extremity and the edema had become markedly increased in a short amount of time.    In [facility] ED, patient is found to have a white count of 11.98, elevated CRP, negative duplex for DVT.    Review of Systems   Otherwise 10-system ROS reviewed and is negative except as mentioned in the HPI.    Personal History     Past Medical History:   Diagnosis Date   • Cancer (CMS/HCC)     skin   • COPD (chronic obstructive pulmonary disease) (CMS/HCC)    • Coronary artery disease    • Diabetes mellitus (CMS/HCC)    • Hyperlipidemia    • Hypertension        Past Surgical History:   Procedure Laterality Date   • APPENDECTOMY     • CARDIAC SURGERY     • CHOLECYSTECTOMY     • COLONOSCOPY     • EYE SURGERY     • HYSTERECTOMY     • SKIN BIOPSY     • TUBAL ABDOMINAL LIGATION     • VASCULAR SURGERY         Family History: family history is not on file.     Social History:  reports that she has been smoking Cigarettes.  She does not have any smokeless tobacco history on file. She reports that she uses drugs, including Marijuana. She reports that she does not drink alcohol.  Social History     Social History Narrative   • No narrative on file       Medications:  Prescriptions  Prior to Admission   Medication Sig Dispense Refill Last Dose   • albuterol (PROVENTIL) (2.5 MG/3ML) 0.083% nebulizer solution Take 2.5 mg by nebulization Every 4 (Four) Hours As Needed for Wheezing.      • aspirin 325 MG tablet Take 325 mg by mouth Daily.      • cetirizine (zyrTEC) 5 MG tablet Take 5 mg by mouth Daily.      • fluticasone-salmeterol (ADVAIR) 250-50 MCG/DOSE DISKUS Inhale 2 (Two) Times a Day.      • hydrochlorothiazide (HYDRODIURIL) 25 MG tablet Take 25 mg by mouth Daily.      • ipratropium-albuterol (DUO-NEB) 0.5-2.5 mg/3 ml nebulizer Take 3 mL by nebulization Every 4 (Four) Hours As Needed for Wheezing.      • lisinopril (PRINIVIL,ZESTRIL) 10 MG tablet Take 10 mg by mouth Daily.      • metFORMIN (GLUCOPHAGE) 500 MG tablet Take 500 mg by mouth Daily With Breakfast.      • pravastatin (PRAVACHOL) 20 MG tablet Take 20 mg by mouth Daily.      • tiotropium (SPIRIVA) 18 MCG per inhalation capsule Place 1 capsule into inhaler and inhale Daily.          Allergies   Allergen Reactions   • Propoxyphene-Acetaminophen Nausea And Vomiting       Objective   Objective     Vital Signs:   Temp:  [98.5 °F (36.9 °C)-98.7 °F (37.1 °C)] 98.6 °F (37 °C)  Heart Rate:  [] 98  Resp:  [20-22] 20  BP: (120-161)/(66-91) 120/75        Physical Exam   Constitutional: No acute distress, awake, alert, nontoxic, obese body habitus  HENT: NCAT, mucous membranes moist, poor dentition  Respiratory: Clear to auscultation bilaterally, good effort, nonlabored respirations   Cardiovascular: RRR, no murmur  Musculoskeletal: RLE  +2 pitting to knee, normal muscle tone for age  Psychiatric: Appropriate affect, good insight and judgement, cooperative  Skin: Angry confluent bright red erythematous cellulitis of right lower extremity circumferential around the ankle and spreading confluently up the pretibial area nearly to the knee.  Margins are marked with sharpie pen for future comparison.      Results Reviewed:  I have personally  reviewed current lab, radiology, and data and agree.      Results from last 7 days  Lab Units 08/15/18  1357   WBC 10*3/mm3 11.98*   HEMOGLOBIN g/dL 13.5   HEMATOCRIT % 43.7   PLATELETS 10*3/mm3 234       Results from last 7 days  Lab Units 08/15/18  1356   SODIUM mmol/L 138   POTASSIUM mmol/L 3.2*   CHLORIDE mmol/L 101   CO2 mmol/L 26.0   BUN mg/dL 11   CREATININE mg/dL 0.78   GLUCOSE mg/dL 178*   CALCIUM mg/dL 8.6*   ALT (SGPT) U/L 17   AST (SGOT) U/L 12     Estimated Creatinine Clearance: 73.7 mL/min (by C-G formula based on SCr of 0.78 mg/dL).  Brief Urine Lab Results     None           Assessment/Plan   Assessment / Plan     Hospital Problem List     Cellulitis of right lower extremity    COPD (chronic obstructive pulmonary disease) (CMS/[facility])    Diabetes mellitus (CMS/[facility])    Coronary artery disease    Hyperlipidemia    Hypertension    Smoker            Assessment & Plan:  Please see above Hospital Problem List which is being actively managed to reflect all current/pertinent diagnoses, the following items may only represent the diagnoses most acutely being managed at time of admission.    RLE cellulitis  - Continue Vanc and Rocephin, suspect will need prolonged IV course due to severity of cellulitis and edema  - ID consult  - Elevate leg when at rest    DM 2  - Hold home oral hypoglycemics  - SSI and low dose basal    Smoker  - Nicotine patch    COPD, hyperlipidemia, hypertension  - Stable chronic conditions  - Continue home meds as indicated      DVT prophylaxis: Lovenox    CODE STATUS:    Code Status and Medical Interventions:   Ordered at: 08/15/18 6452     Code Status:    CPR     Medical Interventions (Level of Support Prior to Arrest):    Full       Admission Status:  I believe this patient meets INPATIENT status due to the need for care which can only be reasonably provided in an hospital setting such as aggressive/expedited ancillary services and/or consultation services, the necessity for IV  "antibiotics medications, close physician monitoring.  In such, I feel patient’s risk for adverse outcomes and need for care warrant INPATIENT evaluation and predict the patient’s care encounter to likely last beyond 2 midnights.        Electronically signed by Rosaura Jeronimo MD, 08/15/18, 11:14 PM.      Electronically signed by Rosaura Jeronimo MD at 8/15/2018 11:25 PM          Emergency Department Notes      Uziel Poon at 8/15/2018  1:57 PM        RAINBOW DRAWN AT TRIAGE.  LACTIC ACID DRAWN AND SENT TO LAB TO HOLD. ONE BLOOD CULTURE DRAWN AND SENT TO LAB TO HOLD.     Uziel Poon  08/15/18 1357      Electronically signed by Uziel Poon at 8/15/2018  1:57 PM       Gladys Iglesias   Duplex scan of extremity veins including responses to compression and other maneuvers; unilateral   Order# [id number]   Reading physician: David Concepcion MD Ordering physician: Shukri Hinson PA Study date: 8/15/18   Patient Information     Patient Name  Gladys Iglesias MRN  [id number] Sex  Female  (Age)  1960 (58 y.o.)   Clinical Indication     edema/swelling; right; calf   Admission Information     Admission Date/Time Discharge Date/Time Room/Bed   08/15/18  1542  S224/1   Interpretation Summary     · Normal right lower extremity venous duplex scan.  · Incidental moderate right inguinal adenopathy noted.      Patient Height, Weight, and Vitals     Height Weight BSA (Calculated - sq m) BMI (kg/m2) Pulse BP   152.4 cm (60\") 73.5 kg (162 lb) 1.71 sq meters 31.7 108 161/91   Study Findings - Right     TDS to evaluate calf but with the C5-1 probe I was able to penetrate the right calf well. Lymph nodes noted in the right groin.   Study Impression     • Right Common Femoral: No deep vein thrombosis noted.   • Right Saphenofemoral Junction: No superficial thrombophlebitis noted.   • Right Proximal Femoral: No deep vein thrombosis noted.   • Right Mid Femoral: No deep vein thrombosis noted.   • Right Distal " Femoral: No deep vein thrombosis noted.   • Right Popliteal: No deep vein thrombosis noted.   • Right Posterior Tibial: No deep vein thrombosis noted.   • Right Peroneal: No deep vein thrombosis noted.   • Right Gastrocnemius Soleal: No deep vein thrombosis noted.   • Right Greater Saphenous Above Knee: No superficial thrombophlebitis noted.   • Right Greater Saphenous Below Knee: No superficial thrombophlebitis noted.   • Right Small Saphenous: No superficial thrombophlebitis noted.   • Left Common Femoral: No deep vein thrombosis noted.

## 2018-08-16 NOTE — PLAN OF CARE
Problem: Patient Care Overview  Goal: Plan of Care Review  Outcome: Ongoing (interventions implemented as appropriate)   08/16/18 0218   Coping/Psychosocial   Plan of Care Reviewed With patient   Plan of Care Review   Progress no change   OTHER   Outcome Summary patient only complaints are pain which is treated with PRN meds and wanting to go smoke. nicotine patch ordered. will continue to monitor.

## 2018-08-16 NOTE — PROGRESS NOTES
Pharmacokinetic Consult - Vancomycin Dosing    Gladys Iglesias is a 58 y.o. female who has been consulted for vancomycin dosing for SSTI.    Relevant clinical data and objective history reviewed:  Creatinine   Date Value Ref Range Status   08/15/2018 0.78 0.60 - 1.30 mg/dL Final     BUN   Date Value Ref Range Status   08/15/2018 11 9 - 23 mg/dL Final     Estimated Creatinine Clearance: 73.7 mL/min (by C-G formula based on SCr of 0.78 mg/dL).  Lab Results   Component Value Date/Time    WBC 11.98 (H) 08/15/2018 01:57 PM    HGB 13.5 08/15/2018 01:57 PM    HCT 43.7 08/15/2018 01:57 PM    MCV 85.5 08/15/2018 01:57 PM     08/15/2018 01:57 PM     Temp Readings from Last 3 Encounters:   08/15/18 98.6 °F (37 °C) (Oral)   01/16/14 98.9 °F (37.2 °C)       Assessment/Plan  The patient got a dose of 1500mg in the ED. Will initiate maintenance dose at 1250 mg IV every 12 hours.  Plan for trough as patient approaches steady state, prior to the 4th dose.  Due to infection severity, will target a trough of 15-20.     Pharmacy will continue to follow the patient’s culture results and clinical progress daily.    Gagan Logan, WilliamD

## 2018-08-16 NOTE — CONSULTS
Gladys Iglesias  1960  [id number]    Date of Consult: 8/16/2018    Date of Admission: 8/15/2018      Requesting Provider:  Rosaura Jeronimo MD  Evaluating Physician: Ervin Muir MD    CC:   Chief Complaint   Patient presents with   • Leg Problem       Reason for Consultation: acute Right lower extremity Cellulitis    History of present illness:    Patient is a 58 y.o.  Yr old female with history of HTN, HLD, DM, COPD, CAD and skin cancer with known PVD in past with aorto-bifemoral bypassing performed at [facility]/. Patient reports that she developed RLE erythema and edema developed on Monday morning around the ankle and progressing to the level of the knee. Reports that she has chronic edema but has never had cellulitis in the past. She went to the [facility] yesterday and was referred immediately to the ED for evaluation. At admission, patient with leukocytosis and no blood cultures collected. ID asked to see for further recommendations.      Past Medical History:   Diagnosis Date   • Cancer (CMS/HCC)     skin   • COPD (chronic obstructive pulmonary disease) (CMS/HCC)    • Coronary artery disease    • Diabetes mellitus (CMS/HCC)    • Hyperlipidemia    • Hypertension        Past Surgical History:   Procedure Laterality Date   • APPENDECTOMY     • CARDIAC SURGERY     • CHOLECYSTECTOMY     • COLONOSCOPY     • EYE SURGERY     • HYSTERECTOMY     • SKIN BIOPSY     • TUBAL ABDOMINAL LIGATION     • VASCULAR SURGERY           Social History Narrative   • Not , lived by herself in [place]. Smokes. No tobacco. Reports a history of MRSA skin infection in the past.        family history is not on file.    Allergies   Allergen Reactions   • Propoxyphene-Acetaminophen Nausea And Vomiting       Medication:  Current Facility-Administered Medications   Medication Dose Route Frequency Provider Last Rate Last Dose   • acetaminophen (TYLENOL) tablet 650 mg  650 mg Oral Q4H PRN Rosaura Jeronimo MD   650 mg at 08/16/18 0327   • aspirin chewable  tablet 81 mg  81 mg Oral Daily Rosaura Jeronimo MD   81 mg at 08/16/18 0954   • atorvastatin (LIPITOR) tablet 10 mg  10 mg Oral Nightly Rosaura Jeronimo MD   10 mg at 08/15/18 2120   • budesonide-formoterol (SYMBICORT) 160-4.5 MCG/ACT inhaler 2 puff  2 puff Inhalation BID - RT Rosaura Jeronimo MD   2 puff at 08/16/18 0935   • cefTRIAXone (ROCEPHIN) IVPB 1 g  1 g Intravenous Q24H Rosaura Jeronimo MD       • cetirizine (zyrTEC) tablet 5 mg  5 mg Oral Daily Rosaura Jeronimo MD   5 mg at 08/16/18 0940   • dextrose (D50W) solution 25 g  25 g Intravenous Q15 Min PRN Rosaura Jeronimo MD       • dextrose (GLUTOSE) oral gel 15 g  15 g Oral Q15 Min PRN Rosaura Jeronimo MD       • enoxaparin (LOVENOX) syringe 40 mg  40 mg Subcutaneous Q24H Rosaura Jeronimo MD   40 mg at 08/16/18 0940   • glucagon (human recombinant) (GLUCAGEN DIAGNOSTIC) injection 1 mg  1 mg Subcutaneous PRN Rosaura Jeronimo MD       • hydrochlorothiazide (HYDRODIURIL) tablet 25 mg  25 mg Oral Daily Rosaura Jeronimo MD   25 mg at 08/16/18 0940   • ibuprofen (ADVIL,MOTRIN) tablet 600 mg  600 mg Oral Q6H PRN Nena Pham MD   600 mg at 08/16/18 1118   • insulin detemir (LEVEMIR) injection 8 Units  8 Units Subcutaneous Nightly Rosaura Jeronimo MD       • insulin lispro (humaLOG) injection 0-7 Units  0-7 Units Subcutaneous 4x Daily With Meals & Nightly Rosaura Jeronimo MD       • Linezolid (ZYVOX) 600 mg 300 mL  600 mg Intravenous Q12H Chantelle Villa PA       • lisinopril (PRINIVIL,ZESTRIL) tablet 10 mg  10 mg Oral Daily Rosaura Jeronimo MD   10 mg at 08/16/18 0940   • Morphine sulfate (PF) injection 1 mg  1 mg Intravenous Q4H PRN Rosaura Jeronimo MD        And   • naloxone (NARCAN) injection 0.4 mg  0.4 mg Intravenous Q5 Min PRN Rosaura Jeronimo MD       • nicotine (NICODERM CQ) 21 MG/24HR patch 1 patch  1 patch Transdermal Q24H Rosaura Jeronimo MD   1 patch at 08/16/18 0941   • nicotine (NICODERM CQ) 21 MG/24HR patch 1 patch  1 patch Transdermal Once Rosaura Jeronimo MD   1 patch at 08/15/18 2136   • ondansetron (ZOFRAN)  "injection 4 mg  4 mg Intravenous Q6H PRN Rosaura Jeronimo MD   4 mg at 18 0954   • oxybutynin XL (DITROPAN-XL) 24 hr tablet 5 mg  5 mg Oral Daily Rosaura Jeronimo MD   5 mg at 18 0940   • Pharmacy to dose vancomycin   Does not apply Continuous PRN Rosaura Jeronimo MD       • potassium chloride (MICRO-K) CR capsule 40 mEq  40 mEq Oral PRN Rosaura Jeronimo MD   40 mEq at 08/15/18 2136    Or   • potassium chloride (KLOR-CON) packet 40 mEq  40 mEq Oral PRN Rosaura Jeronimo MD        Or   • potassium chloride 10 mEq in 100 mL IVPB  10 mEq Intravenous Q1H PRN Rosaura Jeronimo MD       • promethazine (PHENERGAN) tablet 12.5 mg  12.5 mg Oral Q6H PRN Nena Pham MD   12.5 mg at 18 1118    Or   • promethazine (PHENERGAN) injection 12.5 mg  12.5 mg Intravenous Q6H PRN Nena Pham MD       • sodium chloride 0.9 % flush 1-10 mL  1-10 mL Intravenous PRN Rosaura Jeronimo MD       • sodium chloride 0.9 % flush 10 mL  10 mL Intravenous PRN Mark Luna MD       • traMADol (ULTRAM) tablet 50 mg  50 mg Oral Q6H PRN Rosaura Jeronimo MD   50 mg at 18 0537       Antibiotics:  Vanc and Rocephin       Review of Systems  Full 12 point review of systems reviewed and negative except for RLE redness, edema, pain, bullous lesion formation, difficulty walking, nausea, ear pain.     Physical Exam:   Vital Signs   /72 (BP Location: Left arm, Patient Position: Lying)   Pulse 88   Temp 97.9 °F (36.6 °C) (Oral)   Resp 18   Ht 152.4 cm (60\")   Wt 78.6 kg (173 lb 4.8 oz)   SpO2 94%   BMI 33.85 kg/m²   Temp (24hrs), Av.1 °F (36.7 °C), Min:97.6 °F (36.4 °C), Max:98.6 °F (37 °C)      GENERAL: Awake and alert, in no acute distress. Chronically ill appearing.   HEENT: Normocephalic, atraumatic.  PERRL. EOMI. No conjunctival injection. No icterus. Oropharynx clear without evidence of thrush or exudate. 7 teeth remaining on mendible.   NECK: Supple without nuchal rigidity  LYMPH: No cervical, axillary or inguinal lymphadenopathy. No " neck masses  HEART: RRR; No murmur, rubs, gallops.   LUNGS: Clear to auscultation bilaterally without wheezing, rales, rhonchi. Normal respiratory effort.  ABDOMEN: obese, Soft, nontender, nondistended. Positive bowel sounds. No rebound or guarding.   EXT:  BLE 2+ edmea with poor pedal pulses. RLE erythema and edema. Nontendern to touch. Bullous lesions.   : Normal appearing genitalia without Aragon catheter.  MSK: FROM without joint effusions noted    SKIN: RLE cellulitis. PIV present.    NEURO: Oriented to PPT. No focal deficits.   PSYCHIATRIC: Normal insight and judgement. Cooperative with PE    Laboratory Data      Results from last 7 days  Lab Units 08/15/18  1357   WBC 10*3/mm3 11.98*   HEMOGLOBIN g/dL 13.5   HEMATOCRIT % 43.7   PLATELETS 10*3/mm3 234       Results from last 7 days  Lab Units 08/16/18  0445 08/15/18  1356   SODIUM mmol/L  --  138   POTASSIUM mmol/L 3.7 3.2*   CHLORIDE mmol/L  --  101   CO2 mmol/L  --  26.0   BUN mg/dL  --  11   CREATININE mg/dL  --  0.78   GLUCOSE mg/dL  --  178*   CALCIUM mg/dL  --  8.6*       Results from last 7 days  Lab Units 08/15/18  1356   ALK PHOS U/L 106*   BILIRUBIN mg/dL 0.3   ALT (SGPT) U/L 17   AST (SGOT) U/L 12       Results from last 7 days  Lab Units 08/15/18  1357   SED RATE mm/hr 87*       Results from last 7 days  Lab Units 08/15/18  1356   CRP mg/dL 22.08*       Estimated Creatinine Clearance: 73.7 mL/min (by C-G formula based on SCr of 0.78 mg/dL).      Microbiology:  Blood culture: Not collected    Radiology:  Imaging Results (last 24 hours)     ** No results found for the last 24 hours. **            PROBLEM LIST:   Acute right Lower extremity Cellulitis  Leukocytosis  PAD s/p aortobifemoral bypassing  HTN, HLD, DM, Obesity     ASSESSMENT:  Patient is a 57 yo F with RLE cellulitis with leukocytosis in the setting of aortobifemoral bypassing in the past with high risk of bacteremia and bypass infection. Has strep appearance so will do monotherapy with  ceftiaxone 2 grams in house then switch to po cefuroxime 500 mg po bid x 2 weeks upon d/c.    PLAN:  Blood culture x 2 to be collected  crp and cpk in am  Recommend Rocephin no MRSA coverage needed  CBC, BMP in am   Get Doppler of r foot ensure pulses.    UM;  Ok to d/c on cefuroxime 500 mg po bid x 2 weeks over weekend if improves.  If + blood cx and not improving I will see again on Monday to review.  Please call if any ? Over next 3 days.  I left written scripts in chart in prep for d/c.    Dr. Ervin Muir saw the patient, performed the physical exam, reviewed the laboratory data and guided with the formulation of the above problem list, assessment and treatment plan.     Ervin Muir MD  8/16/2018

## 2018-08-17 ENCOUNTER — APPOINTMENT (OUTPATIENT)
Dept: GENERAL RADIOLOGY | Facility: HOSPITAL | Age: 58
End: 2018-08-17

## 2018-08-17 LAB
ANION GAP SERPL CALCULATED.3IONS-SCNC: 2 MMOL/L (ref 3–11)
BASOPHILS # BLD AUTO: 0.01 10*3/MM3 (ref 0–0.2)
BASOPHILS NFR BLD AUTO: 0.1 % (ref 0–1)
BNP SERPL-MCNC: 74 PG/ML (ref 0–100)
BUN BLD-MCNC: 9 MG/DL (ref 9–23)
BUN/CREAT SERPL: 12.9 (ref 7–25)
CALCIUM SPEC-SCNC: 8.7 MG/DL (ref 8.7–10.4)
CHLORIDE SERPL-SCNC: 99 MMOL/L (ref 99–109)
CK SERPL-CCNC: 16 U/L (ref 26–174)
CO2 SERPL-SCNC: 36 MMOL/L (ref 20–31)
CREAT BLD-MCNC: 0.7 MG/DL (ref 0.6–1.3)
CRP SERPL-MCNC: 14.73 MG/DL (ref 0–1)
DEPRECATED RDW RBC AUTO: 47.2 FL (ref 37–54)
EOSINOPHIL # BLD AUTO: 0.13 10*3/MM3 (ref 0–0.3)
EOSINOPHIL NFR BLD AUTO: 1.4 % (ref 0–3)
ERYTHROCYTE [DISTWIDTH] IN BLOOD BY AUTOMATED COUNT: 14.7 % (ref 11.3–14.5)
GFR SERPL CREATININE-BSD FRML MDRD: 86 ML/MIN/1.73
GLUCOSE BLD-MCNC: 182 MG/DL (ref 70–100)
GLUCOSE BLDC GLUCOMTR-MCNC: 115 MG/DL (ref 70–130)
GLUCOSE BLDC GLUCOMTR-MCNC: 129 MG/DL (ref 70–130)
GLUCOSE BLDC GLUCOMTR-MCNC: 171 MG/DL (ref 70–130)
GLUCOSE BLDC GLUCOMTR-MCNC: 180 MG/DL (ref 70–130)
HCT VFR BLD AUTO: 40.7 % (ref 34.5–44)
HGB BLD-MCNC: 12.4 G/DL (ref 11.5–15.5)
IMM GRANULOCYTES # BLD: 0.02 10*3/MM3 (ref 0–0.03)
IMM GRANULOCYTES NFR BLD: 0.2 % (ref 0–0.6)
LYMPHOCYTES # BLD AUTO: 0.79 10*3/MM3 (ref 0.6–4.8)
LYMPHOCYTES NFR BLD AUTO: 8.6 % (ref 24–44)
MCH RBC QN AUTO: 26.8 PG (ref 27–31)
MCHC RBC AUTO-ENTMCNC: 30.5 G/DL (ref 32–36)
MCV RBC AUTO: 88.1 FL (ref 80–99)
MONOCYTES # BLD AUTO: 0.75 10*3/MM3 (ref 0–1)
MONOCYTES NFR BLD AUTO: 8.2 % (ref 0–12)
NEUTROPHILS # BLD AUTO: 7.49 10*3/MM3 (ref 1.5–8.3)
NEUTROPHILS NFR BLD AUTO: 81.7 % (ref 41–71)
PLATELET # BLD AUTO: 221 10*3/MM3 (ref 150–450)
PMV BLD AUTO: 9.8 FL (ref 6–12)
POTASSIUM BLD-SCNC: 3.5 MMOL/L (ref 3.5–5.5)
RBC # BLD AUTO: 4.62 10*6/MM3 (ref 3.89–5.14)
SODIUM BLD-SCNC: 137 MMOL/L (ref 132–146)
VANCOMYCIN TROUGH SERPL-MCNC: 10.7 MCG/ML (ref 10–20)
WBC NRBC COR # BLD: 9.17 10*3/MM3 (ref 3.5–10.8)

## 2018-08-17 PROCEDURE — 82962 GLUCOSE BLOOD TEST: CPT

## 2018-08-17 PROCEDURE — 94760 N-INVAS EAR/PLS OXIMETRY 1: CPT

## 2018-08-17 PROCEDURE — 25010000002 CEFTRIAXONE PER 250 MG: Performed by: FAMILY MEDICINE

## 2018-08-17 PROCEDURE — 94640 AIRWAY INHALATION TREATMENT: CPT

## 2018-08-17 PROCEDURE — 25010000002 ENOXAPARIN PER 10 MG: Performed by: FAMILY MEDICINE

## 2018-08-17 PROCEDURE — 85025 COMPLETE CBC W/AUTO DIFF WBC: CPT | Performed by: PHYSICIAN ASSISTANT

## 2018-08-17 PROCEDURE — 94799 UNLISTED PULMONARY SVC/PX: CPT

## 2018-08-17 PROCEDURE — 99233 SBSQ HOSP IP/OBS HIGH 50: CPT | Performed by: HOSPITALIST

## 2018-08-17 PROCEDURE — 86140 C-REACTIVE PROTEIN: CPT | Performed by: INTERNAL MEDICINE

## 2018-08-17 PROCEDURE — 80202 ASSAY OF VANCOMYCIN: CPT

## 2018-08-17 PROCEDURE — 83880 ASSAY OF NATRIURETIC PEPTIDE: CPT | Performed by: HOSPITALIST

## 2018-08-17 PROCEDURE — 82550 ASSAY OF CK (CPK): CPT | Performed by: INTERNAL MEDICINE

## 2018-08-17 PROCEDURE — 71045 X-RAY EXAM CHEST 1 VIEW: CPT

## 2018-08-17 PROCEDURE — 80048 BASIC METABOLIC PNL TOTAL CA: CPT | Performed by: HOSPITALIST

## 2018-08-17 RX ORDER — IPRATROPIUM BROMIDE AND ALBUTEROL SULFATE 2.5; .5 MG/3ML; MG/3ML
3 SOLUTION RESPIRATORY (INHALATION)
Status: DISCONTINUED | OUTPATIENT
Start: 2018-08-17 | End: 2018-08-19 | Stop reason: HOSPADM

## 2018-08-17 RX ORDER — DOCUSATE SODIUM 100 MG/1
100 CAPSULE, LIQUID FILLED ORAL 2 TIMES DAILY
Status: DISCONTINUED | OUTPATIENT
Start: 2018-08-17 | End: 2018-08-19 | Stop reason: HOSPADM

## 2018-08-17 RX ORDER — IPRATROPIUM BROMIDE AND ALBUTEROL SULFATE 2.5; .5 MG/3ML; MG/3ML
3 SOLUTION RESPIRATORY (INHALATION)
Status: DISCONTINUED | OUTPATIENT
Start: 2018-08-17 | End: 2018-08-17

## 2018-08-17 RX ADMIN — CEFTRIAXONE SODIUM 1 G: 1 INJECTION, SOLUTION INTRAVENOUS at 16:05

## 2018-08-17 RX ADMIN — NICOTINE 1 PATCH: 21 PATCH, EXTENDED RELEASE TRANSDERMAL at 09:23

## 2018-08-17 RX ADMIN — IPRATROPIUM BROMIDE AND ALBUTEROL SULFATE 3 ML: 2.5; .5 SOLUTION RESPIRATORY (INHALATION) at 17:10

## 2018-08-17 RX ADMIN — HYDROCHLOROTHIAZIDE 25 MG: 25 TABLET ORAL at 09:15

## 2018-08-17 RX ADMIN — POLYETHYLENE GLYCOL 3350 17 G: 17 POWDER, FOR SOLUTION ORAL at 10:31

## 2018-08-17 RX ADMIN — ASPIRIN 81 MG CHEWABLE TABLET 81 MG: 81 TABLET CHEWABLE at 09:14

## 2018-08-17 RX ADMIN — BUDESONIDE AND FORMOTEROL FUMARATE DIHYDRATE 2 PUFF: 160; 4.5 AEROSOL RESPIRATORY (INHALATION) at 07:51

## 2018-08-17 RX ADMIN — OXYBUTYNIN CHLORIDE 5 MG: 5 TABLET, EXTENDED RELEASE ORAL at 09:15

## 2018-08-17 RX ADMIN — LISINOPRIL 10 MG: 10 TABLET ORAL at 09:15

## 2018-08-17 RX ADMIN — IBUPROFEN 600 MG: 600 TABLET ORAL at 09:15

## 2018-08-17 RX ADMIN — ENOXAPARIN SODIUM 40 MG: 40 INJECTION SUBCUTANEOUS at 09:16

## 2018-08-17 RX ADMIN — DOCUSATE SODIUM 100 MG: 100 CAPSULE, LIQUID FILLED ORAL at 10:31

## 2018-08-17 RX ADMIN — IPRATROPIUM BROMIDE AND ALBUTEROL SULFATE 3 ML: 2.5; .5 SOLUTION RESPIRATORY (INHALATION) at 18:51

## 2018-08-17 RX ADMIN — IBUPROFEN 600 MG: 600 TABLET ORAL at 20:17

## 2018-08-17 RX ADMIN — BUDESONIDE AND FORMOTEROL FUMARATE DIHYDRATE 2 PUFF: 160; 4.5 AEROSOL RESPIRATORY (INHALATION) at 18:54

## 2018-08-17 RX ADMIN — DOCUSATE SODIUM 100 MG: 100 CAPSULE, LIQUID FILLED ORAL at 20:16

## 2018-08-17 RX ADMIN — ATORVASTATIN CALCIUM 10 MG: 10 TABLET, FILM COATED ORAL at 20:16

## 2018-08-17 RX ADMIN — IPRATROPIUM BROMIDE AND ALBUTEROL SULFATE 3 ML: 2.5; .5 SOLUTION RESPIRATORY (INHALATION) at 03:54

## 2018-08-17 RX ADMIN — CETIRIZINE HYDROCHLORIDE 5 MG: 10 TABLET, FILM COATED ORAL at 09:15

## 2018-08-17 RX ADMIN — IPRATROPIUM BROMIDE AND ALBUTEROL SULFATE 3 ML: 2.5; .5 SOLUTION RESPIRATORY (INHALATION) at 12:26

## 2018-08-17 NOTE — PLAN OF CARE
Problem: Patient Care Overview  Goal: Individualization and Mutuality   08/17/18 1611   Individualization   Patient Specific Goals (Include Timeframe) Pt requesting meds for constipation. Pt has been sleeping all day.

## 2018-08-17 NOTE — PLAN OF CARE
Problem: Patient Care Overview  Goal: Plan of Care Review  Outcome: Ongoing (interventions implemented as appropriate)   08/17/18 0234   Coping/Psychosocial   Plan of Care Reviewed With patient   Plan of Care Review   Progress no change   OTHER   Outcome Summary patient denies any complaints. patient remains on 2L. will continue to monitor.

## 2018-08-17 NOTE — PROGRESS NOTES
[facility] Medicine Services  PROGRESS NOTE    Patient Name: Gladys Iglesias  : 1960  MRN: [id number]    Date of Admission: 8/15/2018  Length of Stay: 2  Primary Care Physician: Provider, No Known    Subjective   Subjective     CC:  F/U RLE cellulitis    HPI:  Patient seen this morning. Slept well. Says she wears O2 at night at home. Required 4 liters overnight here. Pain and redness still in RLE. Requests stool softener.    Review of Systems  Gen-no fevers, no chills  CV-no chest pain, no palpitations  Resp-no cough, no dyspnea  GI-no N/V/D, no abd pain    Otherwise ROS is negative except as mentioned in the HPI.    Objective   Objective     Vital Signs:   Temp:  [97.6 °F (36.4 °C)-99.1 °F (37.3 °C)] 99.1 °F (37.3 °C)  Heart Rate:  [] 109  Resp:  [18-20] 20  BP: (107-136)/(56-77) 136/77        Physical Exam:  Gen-no acute distress  CV-RRR, S1 S2 normal, no m/r/g  Resp-CTAB, no wheezes  Abd-soft, NT, ND, +BS  Ext-RLE with erythema and warmth from ankle to below knee  Neuro-A&Ox3, no focal deficits  Psych-appropriate mood    Results Reviewed:  I have personally reviewed current lab, radiology, and data and agree.      Results from last 7 days  Lab Units 18  0911 08/15/18  1357   WBC 10*3/mm3 9.17 11.98*   HEMOGLOBIN g/dL 12.4 13.5   HEMATOCRIT % 40.7 43.7   PLATELETS 10*3/mm3 221 234       Results from last 7 days  Lab Units 18  0445 08/15/18  1356   SODIUM mmol/L  --  138   POTASSIUM mmol/L 3.7 3.2*   CHLORIDE mmol/L  --  101   CO2 mmol/L  --  26.0   BUN mg/dL  --  11   CREATININE mg/dL  --  0.78   GLUCOSE mg/dL  --  178*   CALCIUM mg/dL  --  8.6*   ALT (SGPT) U/L  --  17   AST (SGOT) U/L  --  12     Estimated Creatinine Clearance: 73.7 mL/min (by YAKOV-G formula based on SCr of 0.78 mg/dL).  No results found for: BNP    Microbiology Results Abnormal     Procedure Component Value - Date/Time    Blood Culture - Blood, [[id number]]  (Normal) Collected:  18 1610    Lab  Status:  Preliminary result Specimen:  Blood from Hand, Right Updated:  08/17/18 0515     Blood Culture No growth at less than 24 hours    Blood Culture - Blood, [[id number]]  (Normal) Collected:  08/16/18 1600    Lab Status:  Preliminary result Specimen:  Blood from Hand, Left Updated:  08/17/18 0515     Blood Culture No growth at less than 24 hours          Imaging Results (last 24 hours)     Procedure Component Value Units Date/Time    XR Chest 1 View [[id number]] Updated:  08/17/18 0927             I have reviewed the medications.      aspirin 81 mg Oral Daily   atorvastatin 10 mg Oral Nightly   budesonide-formoterol 2 puff Inhalation BID - RT   ceftriaxone 1 g Intravenous Q24H   cetirizine 5 mg Oral Daily   docusate sodium 100 mg Oral BID   enoxaparin 40 mg Subcutaneous Q24H   hydrochlorothiazide 25 mg Oral Daily   insulin detemir 8 Units Subcutaneous Nightly   insulin lispro 0-7 Units Subcutaneous 4x Daily With Meals & Nightly   ipratropium-albuterol 3 mL Nebulization 4x Daily - RT   lisinopril 10 mg Oral Daily   nicotine 1 patch Transdermal Q24H   oxybutynin XL 5 mg Oral Daily         Assessment/Plan   Assessment / Plan     Hospital Problem List     * (Principal)Cellulitis of right lower extremity    COPD (chronic obstructive pulmonary disease) (CMS/[facility])    Diabetes mellitus (CMS/[facility])    Coronary artery disease    Hyperlipidemia    Hypertension    Smoker             Brief Hospital Course to date:  Gladys Iglesias is a 58 y.o. female with hx of DM2, HTN, HLD, COPD, and prior episodes of leg cellulitis presents due to redness, swelling, and pain in her right lower extremity.      Assessment & Plan:  --Duplex negative for DVT.  --Continue Vanc and Rocephin. ID following. Recommend transitioning to PO Cefuroxime x 2 weeks at discharge.   --Watch over the weekend, may be able to d/c by Sunday.  --Bowel regimen.  --Scheduled nebs. Wean O2 as tolerated. CXR showing increased markings at the bases and small pleural  effusions. Check BNP.     DVT Prophylaxis:  Lovenox    CODE STATUS:   Code Status and Medical Interventions:   Ordered at: 08/15/18 1845     Code Status:    CPR     Medical Interventions (Level of Support Prior to Arrest):    Full       Disposition: I expect the patient to be discharged home in ~2 days    Electronically signed by Nena Pham MD, 08/17/18, 11:21 AM.

## 2018-08-17 NOTE — PROGRESS NOTES
Continued Stay Note  [facility]     Patient Name: Gladys Iglesias  MRN: [id number]  Today's Date: 8/17/2018    Admit Date: 8/15/2018    Consent obtained for the participation in the [facility] Transitions Program. Luzmaria Hill RN        Discharge Plan    No documentation.             Discharge Codes    No documentation.           Luzmaria Hill RN

## 2018-08-18 LAB
ANION GAP SERPL CALCULATED.3IONS-SCNC: 9 MMOL/L (ref 3–11)
BUN BLD-MCNC: 9 MG/DL (ref 9–23)
BUN/CREAT SERPL: 14.5 (ref 7–25)
CALCIUM SPEC-SCNC: 9.2 MG/DL (ref 8.7–10.4)
CHLORIDE SERPL-SCNC: 97 MMOL/L (ref 99–109)
CO2 SERPL-SCNC: 36 MMOL/L (ref 20–31)
CREAT BLD-MCNC: 0.62 MG/DL (ref 0.6–1.3)
DEPRECATED RDW RBC AUTO: 44.6 FL (ref 37–54)
ERYTHROCYTE [DISTWIDTH] IN BLOOD BY AUTOMATED COUNT: 14.4 % (ref 11.3–14.5)
GFR SERPL CREATININE-BSD FRML MDRD: 99 ML/MIN/1.73
GLUCOSE BLD-MCNC: 126 MG/DL (ref 70–100)
GLUCOSE BLDC GLUCOMTR-MCNC: 122 MG/DL (ref 70–130)
GLUCOSE BLDC GLUCOMTR-MCNC: 123 MG/DL (ref 70–130)
GLUCOSE BLDC GLUCOMTR-MCNC: 153 MG/DL (ref 70–130)
HCT VFR BLD AUTO: 42.7 % (ref 34.5–44)
HGB BLD-MCNC: 13.4 G/DL (ref 11.5–15.5)
MCH RBC QN AUTO: 26.7 PG (ref 27–31)
MCHC RBC AUTO-ENTMCNC: 31.4 G/DL (ref 32–36)
MCV RBC AUTO: 85.1 FL (ref 80–99)
PLATELET # BLD AUTO: 321 10*3/MM3 (ref 150–450)
PMV BLD AUTO: 9.8 FL (ref 6–12)
POTASSIUM BLD-SCNC: 3.6 MMOL/L (ref 3.5–5.5)
RBC # BLD AUTO: 5.02 10*6/MM3 (ref 3.89–5.14)
SODIUM BLD-SCNC: 142 MMOL/L (ref 132–146)
WBC NRBC COR # BLD: 9.11 10*3/MM3 (ref 3.5–10.8)

## 2018-08-18 PROCEDURE — 99233 SBSQ HOSP IP/OBS HIGH 50: CPT | Performed by: HOSPITALIST

## 2018-08-18 PROCEDURE — 25010000003 CEFTRIAXONE PER 250 MG: Performed by: INTERNAL MEDICINE

## 2018-08-18 PROCEDURE — 82962 GLUCOSE BLOOD TEST: CPT

## 2018-08-18 PROCEDURE — 94640 AIRWAY INHALATION TREATMENT: CPT

## 2018-08-18 PROCEDURE — 25010000002 ENOXAPARIN PER 10 MG: Performed by: FAMILY MEDICINE

## 2018-08-18 PROCEDURE — 94799 UNLISTED PULMONARY SVC/PX: CPT

## 2018-08-18 PROCEDURE — 94760 N-INVAS EAR/PLS OXIMETRY 1: CPT

## 2018-08-18 PROCEDURE — 85027 COMPLETE CBC AUTOMATED: CPT | Performed by: HOSPITALIST

## 2018-08-18 PROCEDURE — 80048 BASIC METABOLIC PNL TOTAL CA: CPT | Performed by: HOSPITALIST

## 2018-08-18 RX ORDER — CEFTRIAXONE SODIUM 2 G/50ML
2 INJECTION, SOLUTION INTRAVENOUS EVERY 24 HOURS
Status: DISCONTINUED | OUTPATIENT
Start: 2018-08-18 | End: 2018-08-19 | Stop reason: HOSPADM

## 2018-08-18 RX ADMIN — DOCUSATE SODIUM 100 MG: 100 CAPSULE, LIQUID FILLED ORAL at 20:02

## 2018-08-18 RX ADMIN — IPRATROPIUM BROMIDE AND ALBUTEROL SULFATE 3 ML: 2.5; .5 SOLUTION RESPIRATORY (INHALATION) at 07:34

## 2018-08-18 RX ADMIN — LISINOPRIL 10 MG: 10 TABLET ORAL at 09:30

## 2018-08-18 RX ADMIN — IPRATROPIUM BROMIDE AND ALBUTEROL SULFATE 3 ML: 2.5; .5 SOLUTION RESPIRATORY (INHALATION) at 15:44

## 2018-08-18 RX ADMIN — CETIRIZINE HYDROCHLORIDE 5 MG: 10 TABLET, FILM COATED ORAL at 09:29

## 2018-08-18 RX ADMIN — ENOXAPARIN SODIUM 40 MG: 40 INJECTION SUBCUTANEOUS at 09:28

## 2018-08-18 RX ADMIN — HYDROCHLOROTHIAZIDE 25 MG: 25 TABLET ORAL at 09:29

## 2018-08-18 RX ADMIN — BUDESONIDE AND FORMOTEROL FUMARATE DIHYDRATE 2 PUFF: 160; 4.5 AEROSOL RESPIRATORY (INHALATION) at 07:37

## 2018-08-18 RX ADMIN — TRAMADOL HYDROCHLORIDE 50 MG: 50 TABLET, COATED ORAL at 06:34

## 2018-08-18 RX ADMIN — DOCUSATE SODIUM 100 MG: 100 CAPSULE, LIQUID FILLED ORAL at 09:29

## 2018-08-18 RX ADMIN — CEFTRIAXONE SODIUM 2 G: 2 INJECTION, SOLUTION INTRAVENOUS at 15:32

## 2018-08-18 RX ADMIN — IPRATROPIUM BROMIDE AND ALBUTEROL SULFATE 3 ML: 2.5; .5 SOLUTION RESPIRATORY (INHALATION) at 12:02

## 2018-08-18 RX ADMIN — NICOTINE 1 PATCH: 21 PATCH, EXTENDED RELEASE TRANSDERMAL at 09:32

## 2018-08-18 RX ADMIN — BUDESONIDE AND FORMOTEROL FUMARATE DIHYDRATE 2 PUFF: 160; 4.5 AEROSOL RESPIRATORY (INHALATION) at 21:02

## 2018-08-18 RX ADMIN — OXYBUTYNIN CHLORIDE 5 MG: 5 TABLET, EXTENDED RELEASE ORAL at 09:29

## 2018-08-18 RX ADMIN — ASPIRIN 81 MG CHEWABLE TABLET 81 MG: 81 TABLET CHEWABLE at 09:30

## 2018-08-18 RX ADMIN — TRAMADOL HYDROCHLORIDE 50 MG: 50 TABLET, COATED ORAL at 18:25

## 2018-08-18 RX ADMIN — IPRATROPIUM BROMIDE AND ALBUTEROL SULFATE 3 ML: 2.5; .5 SOLUTION RESPIRATORY (INHALATION) at 21:02

## 2018-08-18 RX ADMIN — ATORVASTATIN CALCIUM 10 MG: 10 TABLET, FILM COATED ORAL at 20:02

## 2018-08-18 NOTE — PROGRESS NOTES
[facility] Medicine Services  PROGRESS NOTE    Patient Name: Gladys Iglesias  : 1960  MRN: [id number]    Date of Admission: 8/15/2018  Length of Stay: 3  Primary Care Physician: Provider, No Known    Subjective   Subjective     CC:  F/U RLE cellulitis    HPI:  Patient seen this morning. Thinks her leg is more swollen today but the redness and warmth are improving. Says she was supposed to be on 24/7 oxygen at home but at some point took herself off of it during the day and now only wears at night.     Review of Systems  Gen-no fevers, no chills  CV-no chest pain, no palpitations  Resp-no cough, no dyspnea  GI-no N/V/D, no abd pain    Otherwise ROS is negative except as mentioned in the HPI.    Objective   Objective     Vital Signs:   Temp:  [97.7 °F (36.5 °C)-98.4 °F (36.9 °C)] 98 °F (36.7 °C)  Heart Rate:  [] 94  Resp:  [16-18] 16  BP: (114-173)/(63-88) 139/88        Physical Exam:  Gen-no acute distress  CV-RRR, S1 S2 normal, no m/r/g  Resp-mild coarse sounds bilaterally, no wheezes, nonlabored  Abd-soft, NT, ND, +BS  Ext-RLE with erythema and warmth and swelling from ankle to below knee, improving slowly  Neuro-A&Ox3, no focal deficits  Psych-appropriate mood    Results Reviewed:  I have personally reviewed current lab, radiology, and data and agree.      Results from last 7 days  Lab Units 18  0759 18  0911 08/15/18  1357   WBC 10*3/mm3 9.11 9.17 11.98*   HEMOGLOBIN g/dL 13.4 12.4 13.5   HEMATOCRIT % 42.7 40.7 43.7   PLATELETS 10*3/mm3 321 221 234       Results from last 7 days  Lab Units 18  0759 18  0911 18  0445 08/15/18  1356   SODIUM mmol/L 142 137  --  138   POTASSIUM mmol/L 3.6 3.5 3.7 3.2*   CHLORIDE mmol/L 97* 99  --  101   CO2 mmol/L 36.0* 36.0*  --  26.0   BUN mg/dL 9 9  --  11   CREATININE mg/dL 0.62 0.70  --  0.78   GLUCOSE mg/dL 126* 182*  --  178*   CALCIUM mg/dL 9.2 8.7  --  8.6*   ALT (SGPT) U/L  --   --   --  17   AST (SGOT)  U/L  --   --   --  12     Estimated Creatinine Clearance: 92.8 mL/min (by C-G formula based on SCr of 0.62 mg/dL).  BNP   Date Value Ref Range Status   08/17/2018 74.0 0.0 - 100.0 pg/mL Final     Comment:     Results may be falsely decreased if patient taking Biotin.       Microbiology Results Abnormal     Procedure Component Value - Date/Time    Blood Culture - Blood, [[id number]]  (Normal) Collected:  08/16/18 1610    Lab Status:  Preliminary result Specimen:  Blood from Hand, Right Updated:  08/17/18 1715     Blood Culture No growth at 24 hours    Blood Culture - Blood, [[id number]]  (Normal) Collected:  08/16/18 1600    Lab Status:  Preliminary result Specimen:  Blood from Hand, Left Updated:  08/17/18 1715     Blood Culture No growth at 24 hours          Imaging Results (last 24 hours)     ** No results found for the last 24 hours. **             I have reviewed the medications.      aspirin 81 mg Oral Daily   atorvastatin 10 mg Oral Nightly   budesonide-formoterol 2 puff Inhalation BID - RT   ceftriaxone 2 g Intravenous Q24H   cetirizine 5 mg Oral Daily   docusate sodium 100 mg Oral BID   enoxaparin 40 mg Subcutaneous Q24H   hydrochlorothiazide 25 mg Oral Daily   insulin detemir 8 Units Subcutaneous Nightly   insulin lispro 0-7 Units Subcutaneous 4x Daily With Meals & Nightly   ipratropium-albuterol 3 mL Nebulization 4x Daily - RT   lisinopril 10 mg Oral Daily   nicotine 1 patch Transdermal Q24H   oxybutynin XL 5 mg Oral Daily         Assessment/Plan   Assessment / Plan     Hospital Problem List     * (Principal)Cellulitis of right lower extremity    COPD (chronic obstructive pulmonary disease) (CMS/[facility])    Diabetes mellitus (CMS/[facility])    Coronary artery disease    Hyperlipidemia    Hypertension    Smoker             Brief Hospital Course to date:  Gladys Iglesias is a 58 y.o. female with hx of DM2, HTN, HLD, COPD, and prior episodes of leg cellulitis presents due to redness, swelling, and pain in her right lower  extremity.      Assessment & Plan:  --Duplex negative for DVT.  --Continue Vanc and Rocephin. ID following. Recommend transitioning to PO Cefuroxime x 2 weeks at discharge.   --Scheduled nebs. Wean O2 as tolerated. CXR showing increased markings at the bases and small pleural effusions. BNP normal. She is supposed to wear O2 24/7 and has only been wearing at night. Encouraged her to wear during the day as well.    Potential d/c home tomorrow?    DVT Prophylaxis:  Lovenox    CODE STATUS:   Code Status and Medical Interventions:   Ordered at: 08/15/18 1845     Code Status:    CPR     Medical Interventions (Level of Support Prior to Arrest):    Full       Disposition: I expect the patient to be discharged home in ~1-2 days    Electronically signed by Nena Pham MD, 08/18/18, 2:21 PM.

## 2018-08-18 NOTE — PLAN OF CARE
Problem: Infection, Risk/Actual (Adult)  Intervention: Manage Suspected/Actual Infection   08/18/18 1442   Prevent/Manage Colorectal Surgical Infection   Fever Reduction/Comfort Measures medication administered

## 2018-08-18 NOTE — PLAN OF CARE
Problem: Patient Care Overview  Goal: Plan of Care Review  Outcome: Ongoing (interventions implemented as appropriate)   08/18/18 2956   OTHER   Outcome Summary Medicated x1 for pain, VSS, independent ADL's, remains on O2 @ 2L.

## 2018-08-19 VITALS
WEIGHT: 173.3 LBS | OXYGEN SATURATION: 88 % | DIASTOLIC BLOOD PRESSURE: 83 MMHG | TEMPERATURE: 97.9 F | BODY MASS INDEX: 34.02 KG/M2 | SYSTOLIC BLOOD PRESSURE: 131 MMHG | HEIGHT: 60 IN | HEART RATE: 100 BPM | RESPIRATION RATE: 18 BRPM

## 2018-08-19 LAB
GLUCOSE BLDC GLUCOMTR-MCNC: 128 MG/DL (ref 70–130)
GLUCOSE BLDC GLUCOMTR-MCNC: 133 MG/DL (ref 70–130)

## 2018-08-19 PROCEDURE — 82962 GLUCOSE BLOOD TEST: CPT

## 2018-08-19 PROCEDURE — 94640 AIRWAY INHALATION TREATMENT: CPT

## 2018-08-19 PROCEDURE — 99239 HOSP IP/OBS DSCHRG MGMT >30: CPT | Performed by: HOSPITALIST

## 2018-08-19 PROCEDURE — 25010000002 ENOXAPARIN PER 10 MG: Performed by: FAMILY MEDICINE

## 2018-08-19 PROCEDURE — 25010000003 CEFTRIAXONE PER 250 MG: Performed by: INTERNAL MEDICINE

## 2018-08-19 RX ORDER — PRAVASTATIN SODIUM 20 MG
20 TABLET ORAL DAILY
Qty: 30 TABLET | Refills: 0 | Status: SHIPPED | OUTPATIENT
Start: 2018-08-19 | End: 2018-09-18

## 2018-08-19 RX ORDER — NICOTINE 21 MG/24HR
1 PATCH, TRANSDERMAL 24 HOURS TRANSDERMAL
Qty: 30 PATCH | Refills: 0 | Status: SHIPPED | OUTPATIENT
Start: 2018-08-20 | End: 2018-09-19

## 2018-08-19 RX ORDER — ALBUTEROL SULFATE 90 UG/1
2 AEROSOL, METERED RESPIRATORY (INHALATION) EVERY 4 HOURS PRN
Qty: 1 INHALER | Refills: 0 | Status: SHIPPED | OUTPATIENT
Start: 2018-08-19 | End: 2018-09-18

## 2018-08-19 RX ORDER — TRAMADOL HYDROCHLORIDE 50 MG/1
50 TABLET ORAL EVERY 6 HOURS PRN
Qty: 12 TABLET | Refills: 0 | Status: SHIPPED | OUTPATIENT
Start: 2018-08-19 | End: 2018-08-22

## 2018-08-19 RX ORDER — CEFUROXIME AXETIL 250 MG/1
500 TABLET ORAL 2 TIMES DAILY
Qty: 56 TABLET | Refills: 0 | Status: SHIPPED | OUTPATIENT
Start: 2018-08-19 | End: 2018-09-02

## 2018-08-19 RX ORDER — HYDROCHLOROTHIAZIDE 25 MG/1
25 TABLET ORAL DAILY
Qty: 30 TABLET | Refills: 0 | Status: SHIPPED | OUTPATIENT
Start: 2018-08-19 | End: 2018-09-18

## 2018-08-19 RX ORDER — BUDESONIDE AND FORMOTEROL FUMARATE DIHYDRATE 160; 4.5 UG/1; UG/1
2 AEROSOL RESPIRATORY (INHALATION)
Start: 2018-08-19

## 2018-08-19 RX ADMIN — CETIRIZINE HYDROCHLORIDE 5 MG: 10 TABLET, FILM COATED ORAL at 09:11

## 2018-08-19 RX ADMIN — ENOXAPARIN SODIUM 40 MG: 40 INJECTION SUBCUTANEOUS at 09:10

## 2018-08-19 RX ADMIN — ASPIRIN 81 MG CHEWABLE TABLET 81 MG: 81 TABLET CHEWABLE at 09:11

## 2018-08-19 RX ADMIN — CEFTRIAXONE SODIUM 2 G: 2 INJECTION, SOLUTION INTRAVENOUS at 13:59

## 2018-08-19 RX ADMIN — OXYBUTYNIN CHLORIDE 5 MG: 5 TABLET, EXTENDED RELEASE ORAL at 09:11

## 2018-08-19 RX ADMIN — HYDROCHLOROTHIAZIDE 25 MG: 25 TABLET ORAL at 09:13

## 2018-08-19 RX ADMIN — NICOTINE 1 PATCH: 21 PATCH, EXTENDED RELEASE TRANSDERMAL at 09:00

## 2018-08-19 RX ADMIN — BUDESONIDE AND FORMOTEROL FUMARATE DIHYDRATE 2 PUFF: 160; 4.5 AEROSOL RESPIRATORY (INHALATION) at 08:45

## 2018-08-19 RX ADMIN — LISINOPRIL 10 MG: 10 TABLET ORAL at 09:00

## 2018-08-19 RX ADMIN — IPRATROPIUM BROMIDE AND ALBUTEROL SULFATE 3 ML: 2.5; .5 SOLUTION RESPIRATORY (INHALATION) at 08:45

## 2018-08-19 RX ADMIN — DOCUSATE SODIUM 100 MG: 100 CAPSULE, LIQUID FILLED ORAL at 09:11

## 2018-08-19 NOTE — DISCHARGE PLACEMENT REQUEST
"Gladys Iglesias (58 y.o. Female)     Date of Birth Social Security Number Address Home Phone MRN    1960  [address] [place] [address] [phone] [id number]    Denominational Marital Status          None Single       Admission Date Admission Type Admitting Provider Attending Provider Department, Room/Bed    8/15/18 Emergency Nena Pham MD Reddy, Mayuri V, MD [address], [address]/    Discharge Date Discharge Disposition Discharge Destination         Home or Self Care              Attending Provider:  Nena Pham MD    Allergies:  Propoxyphene-acetaminophen    Isolation:  None   Infection:  None   Code Status:  CPR    Ht:  152.4 cm (60\")   Wt:  78.6 kg (173 lb 4.8 oz)    Admission Cmt:  None   Principal Problem:  Cellulitis of right lower extremity [L03.115]                 Active Insurance as of 8/15/2018     Primary Coverage     Payor Plan Insurance Group Employer/Plan Group    [employer] [place] [employer] [place]ICA[place]      Payor Plan Address Payor Plan Phone Number Effective From Effective To    [address] [address] [phone] 8/15/2018     [facility] [address]       Subscriber Name Subscriber Birth Date Member ID       GLADYS IGLESIAS 1960 [id number]                 Emergency Contacts      (Rel.) Home Phone Work Phone Mobile Phone    Ludin Iglesias (Son) [phone] -- --            Insurance Information                [facility]/[employer] MEDICAID Phone: [phone]    Subscriber: Gladys Iglesias Subscriber#: [id number]    Group#:  Precert#:         [address] [address]  Phone:  [phone]  Fax:          Patient:     Gladys Iglesias MRN:  [id number]   [address] [place] [address] :  1960  SSN:    Phone: [phone] Sex:  F      INSURANCE PAYOR PLAN GROUP # SUBSCRIBER ID   Primary:    [facility] [id number]   [id number]   Admitting Diagnosis: Cellulitis of right lower extremity " [L03.115]  Order Date:  Aug 19, 2018         Inpatient Case Management  Consult       (Order ID: [id number])     Diagnosis:         Priority:  Routine Expected Date:   Expiration Date:        Interval:  Once Count:    Reason for Consult? patient needs to wear oxygen 24/7 (previously only wearing at night); has COPD; does this need to be arranged? 2 liters     Specimen Type:   Specimen Source:   Specimen Taken Date:   Specimen Taken Time:                   Authorizing Provider:Nena Pham MD  Authorizing Provider's NPI: [id number]  Order Entered By: Nena Pham MD 8/19/2018 12:19 PM     Electronically signed by: Nena Pham MD 8/19/2018 12:19 PM

## 2018-08-19 NOTE — DISCHARGE PLACEMENT REQUEST
"Gladys Vega (58 y.o. Female)     Date of Birth Social Security Number Address Home Phone MRN    1960  [address] [place] [address] [phone] [id number]    Church Marital Status          None Single       Admission Date Admission Type Admitting Provider Attending Provider Department, Room/Bed    8/15/18 Emergency Nena Pham MD Reddy, Mayuri V, MD [facility] 2F, S214/1    Discharge Date Discharge Disposition Discharge Destination         Home or Self Care              Attending Provider:  Nena Pham MD    Allergies:  Propoxyphene-acetaminophen    Isolation:  None   Infection:  None   Code Status:  CPR    Ht:  152.4 cm (60\")   Wt:  78.6 kg (173 lb 4.8 oz)    Admission Cmt:  None   Principal Problem:  Cellulitis of right lower extremity [L03.115]                 Active Insurance as of 8/15/2018     Primary Coverage     Payor Plan Insurance Group Employer/Plan Group    [employer] [place] [employer] [place]      Payor Plan Address Payor Plan Phone Number Effective From Effective To    [address] [address] [phone] 8/15/2018     [facility] [id number]       Subscriber Name Subscriber Birth Date Member ID       GLADYS VEGA 1960 [id number]                 Emergency Contacts      (Rel.) Home Phone Work Phone Mobile Phone    Ludin Vega (Son) [phone] -- --            Insurance Information                [facility]/[employer]LL[employer] MEDICAID Phone: [phone]    Subscriber: Gladys Vega Subscriber#: [id number]    Group#:  Precert#:              History & Physical      Rosaura Jeronimo MD at 8/15/2018 11:14 PM              [facility] Medicine Services  HISTORY AND PHYSICAL    Patient Name: Gladys Vega  : 1960  MRN: [id number]  Primary Care Physician: Provider, No Known    Subjective   Subjective     Chief Complaint:  Right lower extremity cellulitis    HPI:  Gladys Vega is a 58 y.o. female with a past medical history of hypertension, " dyslipidemia, COPD with ongoing smoking, CAD, DM 2.   presents with 2 days of rapidly progressing right lower extremity cellulitis.  She has had previous remote episodes of right lower extremity cellulitis associated with prior wounds for which she has scars on the distal pretibial areas.  She states that she was standing on her feet for longer than usual 2 days ago while waiting in line at an office, when she got home her right lower extremity was burning and noted to be more edematous than the left.  Patient states that she elevated the leg as she has had previous experience with edema and erythema in her lower extremities, but this did not help her symptoms.  She awoke the next day with bright tender erythema initially lacy but as the day continued became more confluent and angry appearing.  She came to the ED for further evaluation stating that pain was so severe she could not bear weight on the right lower extremity and the edema had become markedly increased in a short amount of time.    In [facility] ED, patient is found to have a white count of 11.98, elevated CRP, negative duplex for DVT.    Review of Systems   Otherwise 10-system ROS reviewed and is negative except as mentioned in the HPI.    Personal History     Past Medical History:   Diagnosis Date   • Cancer (CMS/HCC)     skin   • COPD (chronic obstructive pulmonary disease) (CMS/HCC)    • Coronary artery disease    • Diabetes mellitus (CMS/HCC)    • Hyperlipidemia    • Hypertension        Past Surgical History:   Procedure Laterality Date   • APPENDECTOMY     • CARDIAC SURGERY     • CHOLECYSTECTOMY     • COLONOSCOPY     • EYE SURGERY     • HYSTERECTOMY     • SKIN BIOPSY     • TUBAL ABDOMINAL LIGATION     • VASCULAR SURGERY         Family History: family history is not on file.     Social History:  reports that she has been smoking Cigarettes.  She does not have any smokeless tobacco history on file. She reports that she uses drugs, including Marijuana.  She reports that she does not drink alcohol.  Social History     Social History Narrative   • No narrative on file       Medications:  Prescriptions Prior to Admission   Medication Sig Dispense Refill Last Dose   • albuterol (PROVENTIL) (2.5 MG/3ML) 0.083% nebulizer solution Take 2.5 mg by nebulization Every 4 (Four) Hours As Needed for Wheezing.      • aspirin 325 MG tablet Take 325 mg by mouth Daily.      • cetirizine (zyrTEC) 5 MG tablet Take 5 mg by mouth Daily.      • fluticasone-salmeterol (ADVAIR) 250-50 MCG/DOSE DISKUS Inhale 2 (Two) Times a Day.      • hydrochlorothiazide (HYDRODIURIL) 25 MG tablet Take 25 mg by mouth Daily.      • ipratropium-albuterol (DUO-NEB) 0.5-2.5 mg/3 ml nebulizer Take 3 mL by nebulization Every 4 (Four) Hours As Needed for Wheezing.      • lisinopril (PRINIVIL,ZESTRIL) 10 MG tablet Take 10 mg by mouth Daily.      • metFORMIN (GLUCOPHAGE) 500 MG tablet Take 500 mg by mouth Daily With Breakfast.      • pravastatin (PRAVACHOL) 20 MG tablet Take 20 mg by mouth Daily.      • tiotropium (SPIRIVA) 18 MCG per inhalation capsule Place 1 capsule into inhaler and inhale Daily.          Allergies   Allergen Reactions   • Propoxyphene-Acetaminophen Nausea And Vomiting       Objective   Objective     Vital Signs:   Temp:  [98.5 °F (36.9 °C)-98.7 °F (37.1 °C)] 98.6 °F (37 °C)  Heart Rate:  [] 98  Resp:  [20-22] 20  BP: (120-161)/(66-91) 120/75        Physical Exam   Constitutional: No acute distress, awake, alert, nontoxic, obese body habitus  HENT: NCAT, mucous membranes moist, poor dentition  Respiratory: Clear to auscultation bilaterally, good effort, nonlabored respirations   Cardiovascular: RRR, no murmur  Musculoskeletal: RLE  +2 pitting to knee, normal muscle tone for age  Psychiatric: Appropriate affect, good insight and judgement, cooperative  Skin: Angry confluent bright red erythematous cellulitis of right lower extremity circumferential around the ankle and spreading confluently  up the pretibial area nearly to the knee.  Margins are marked with sharpie pen for future comparison.      Results Reviewed:  I have personally reviewed current lab, radiology, and data and agree.      Results from last 7 days  Lab Units 08/15/18  1357   WBC 10*3/mm3 11.98*   HEMOGLOBIN g/dL 13.5   HEMATOCRIT % 43.7   PLATELETS 10*3/mm3 234       Results from last 7 days  Lab Units 08/15/18  1356   SODIUM mmol/L 138   POTASSIUM mmol/L 3.2*   CHLORIDE mmol/L 101   CO2 mmol/L 26.0   BUN mg/dL 11   CREATININE mg/dL 0.78   GLUCOSE mg/dL 178*   CALCIUM mg/dL 8.6*   ALT (SGPT) U/L 17   AST (SGOT) U/L 12     Estimated Creatinine Clearance: 73.7 mL/min (by C-G formula based on SCr of 0.78 mg/dL).  Brief Urine Lab Results     None           Assessment/Plan   Assessment / Plan     Hospital Problem List     Cellulitis of right lower extremity    COPD (chronic obstructive pulmonary disease) (CMS/[facility])    Diabetes mellitus (CMS/[facility])    Coronary artery disease    Hyperlipidemia    Hypertension    Smoker            Assessment & Plan:  Please see above Hospital Problem List which is being actively managed to reflect all current/pertinent diagnoses, the following items may only represent the diagnoses most acutely being managed at time of admission.    RLE cellulitis  - Continue Vanc and Rocephin, suspect will need prolonged IV course due to severity of cellulitis and edema  - ID consult  - Elevate leg when at rest    DM 2  - Hold home oral hypoglycemics  - SSI and low dose basal    Smoker  - Nicotine patch    COPD, hyperlipidemia, hypertension  - Stable chronic conditions  - Continue home meds as indicated      DVT prophylaxis: Lovenox    CODE STATUS:    Code Status and Medical Interventions:   Ordered at: 08/15/18 8276     Code Status:    CPR     Medical Interventions (Level of Support Prior to Arrest):    Full       Admission Status:  I believe this patient meets INPATIENT status due to the need for care which can only be  reasonably provided in an hospital setting such as aggressive/expedited ancillary services and/or consultation services, the necessity for IV antibiotics medications, close physician monitoring.  In such, I feel patient’s risk for adverse outcomes and need for care warrant INPATIENT evaluation and predict the patient’s care encounter to likely last beyond 2 midnights.        Electronically signed by Rosaura Jeronimo MD, 08/15/18, 11:14 PM.      Electronically signed by Rosaura Jeronimo MD at 8/15/2018 11:25 PM            Discharge Summary      Nena Pham MD at 2018 12:13 PM              [facility] Medicine Services  DISCHARGE SUMMARY    Patient Name: Gladys Iglesias  : 1960  MRN: [id number]    Date of Admission: 8/15/2018  Date of Discharge:  18  Primary Care Physician: Provider, No Known    Consults     Date and Time Order Name Status Description    8/15/2018 2320 Inpatient Infectious Diseases Consult Completed         Hospital Course     Presenting Problem:   Cellulitis of right lower extremity [L03.115]    Active Hospital Problems    Diagnosis Date Noted   • **Cellulitis of right lower extremity [L03.115] 08/15/2018   • COPD (chronic obstructive pulmonary disease) (CMS/[facility]) [J44.9] 08/15/2018   • Diabetes mellitus (CMS/[facility]) [E11.9] 08/15/2018   • Coronary artery disease [I25.10] 08/15/2018   • Hyperlipidemia [E78.5] 08/15/2018   • Hypertension [I10] 08/15/2018   • Smoker [F17.200] 08/15/2018      Resolved Hospital Problems    Diagnosis Date Noted Date Resolved   No resolved problems to display.          Hospital Course:  Gladys Iglesias is a 58 y.o. female with hx of DM2, HTN, HLD, COPD, and prior episodes of leg cellulitis presents due to redness, swelling, and pain in her right lower extremity. She has hx of recurrent cellulitis. Has PVD with hx of aortobifemoral bypass, and thus has high risk of bacteremia and bypass infection. She had WBC 11.98 on admission. Duplex ruled out  "DVT. Started on Vanc and Rocephin. Infectious Disease was consulted. Blood cultures remained no growth. Dr. Muir recommends 14 days of Cefuroxime 500 mg BID. Redness and swelling have improved but she continues to have deep erythema which I think will take time to fade. She should continue to elevate the right leg at home. If she has any worsening swelling, redness, pain, or fevers at home she was instructed to return to the ER for evaluation.     She said she was just about out of many of her home medications, as she is in the process of getting in with a new PCP but appointment is not till beginning of October. I will give her a 1 month supply of her meds to help her through.     Patient required O2 during the day here, and she typically only wears it at night at home. CXR performed and showed some small bilateral effusions and increased markings however BNP was normal. She says she used to be on 24/7 home oxygen but \"took myself off of it.\" I recommend she remain on O2 during the day as well. She has remained on a nicotine patch this admission (last cigarette was day prior to admission), and has not had any craving for cigarettes. She is motivated to remain tobacco free, and I of course strongly encouraged this. I will send a script for nicotine patch to her pharmacy, per her request.       Discharge Follow Up Recommendations for labs/diagnostics:  F/U with PCP in 1 week or as scheduled    Day of Discharge     HPI:   No complaints, swelling has dramatically improved in her leg since yesterday. Less warm. Eager for discharge home.    Review of Systems  Gen-no fevers, no chills  CV-no chest pain, no palpitations  Resp-no cough, no dyspnea  GI-no N/V/D, no abd pain      Otherwise ROS is negative except as mentioned in the HPI.    Vital Signs:   Temp:  [97.7 °F (36.5 °C)-98 °F (36.7 °C)] 97.9 °F (36.6 °C)  Heart Rate:  [] 100  Resp:  [16-20] 18  BP: (131-150)/(78-86) 131/83     Physical Exam:  Gen-no acute " distress  CV-RRR, S1 S2 normal, no m/r/g  Resp-mildly diminished at the bases, no wheezes, normal effort  Abd-soft, NT, ND, +BS  Ext-RLE erythema and warmth improving from deep erythema persists in lower leg, swelling has improved significantly  Neuro-A&Ox3, no focal deficits  Psych-appropriate mood      Pertinent  and/or Most Recent Results       Results from last 7 days  Lab Units 08/18/18  0759 08/17/18  0911 08/16/18  0445 08/15/18  1357 08/15/18  1356   WBC 10*3/mm3 9.11 9.17  --  11.98*  --    HEMOGLOBIN g/dL 13.4 12.4  --  13.5  --    HEMATOCRIT % 42.7 40.7  --  43.7  --    PLATELETS 10*3/mm3 321 221  --  234  --    SODIUM mmol/L 142 137  --   --  138   POTASSIUM mmol/L 3.6 3.5 3.7  --  3.2*   CHLORIDE mmol/L 97* 99  --   --  101   CO2 mmol/L 36.0* 36.0*  --   --  26.0   BUN mg/dL 9 9  --   --  11   CREATININE mg/dL 0.62 0.70  --   --  0.78   GLUCOSE mg/dL 126* 182*  --   --  178*   CALCIUM mg/dL 9.2 8.7  --   --  8.6*       Results from last 7 days  Lab Units 08/15/18  1356   BILIRUBIN mg/dL 0.3   ALK PHOS U/L 106*   ALT (SGPT) U/L 17   AST (SGOT) U/L 12           Invalid input(s): TG, LDLCALC, LDLREALC    Results from last 7 days  Lab Units 08/17/18  1301 08/16/18  0903   HEMOGLOBIN A1C %  --  6.90*   BNP pg/mL 74.0  --      Brief Urine Lab Results     None          Microbiology Results Abnormal     Procedure Component Value - Date/Time    Blood Culture - Blood, [[id number]]  (Normal) Collected:  08/16/18 1610    Lab Status:  Preliminary result Specimen:  Blood from Hand, Right Updated:  08/18/18 1715     Blood Culture No growth at 2 days    Blood Culture - Blood, [[id number]]  (Normal) Collected:  08/16/18 1600    Lab Status:  Preliminary result Specimen:  Blood from Hand, Left Updated:  08/18/18 1710     Blood Culture No growth at 2 days          Imaging Results (all)     Procedure Component Value Units Date/Time    XR Chest 1 View [[id number]] Collected:  08/17/18 1028     Updated:  08/17/18 1032     Narrative:       EXAMINATION: XR CHEST 1 VW-      INDICATION: hypoxia; L03.115-Cellulitis of right lower limb;  I73.9-Peripheral vascular disease, unspecified      COMPARISON: 03/19/2013.     FINDINGS: Portable chest reveals heart to be borderline enlarged. Patchy  ill-defined opacification seen at the lower lung fields bilaterally.  Small bilateral pleural effusions. Degenerative change is seen within  the spine. Pulmonary vascularity is within normal limits.           Impression:       Increased markings seen at the lung bases bilaterally with  small bilateral pleural effusions.     D:  08/17/2018  E:  08/17/2018     This report was finalized on 8/17/2018 10:30 AM by Dr. Justina Henley MD.             Results for orders placed during the hospital encounter of 08/15/18   Duplex Venous Lower Extremity - Right CAR    Narrative · Normal right lower extremity venous duplex scan.  · Incidental moderate right inguinal adenopathy noted.          Results for orders placed during the hospital encounter of 08/15/18   Duplex Venous Lower Extremity - Right CAR    Narrative · Normal right lower extremity venous duplex scan.  · Incidental moderate right inguinal adenopathy noted.                Order Current Status    Blood Culture - Blood, Preliminary result    Blood Culture - Blood, Preliminary result        Discharge Details        Discharge Medications      New Medications      Instructions Start Date   budesonide-formoterol 160-4.5 MCG/ACT inhaler  Commonly known as:  SYMBICORT   2 puffs, Inhalation, 2 Times Daily - RT      cefuroxime 250 MG tablet  Commonly known as:  CEFTIN   500 mg, Oral, 2 Times Daily      nicotine 21 MG/24HR patch  Commonly known as:  NICODERM CQ   1 patch, Transdermal, Every 24 Hours Scheduled      traMADol 50 MG tablet  Commonly known as:  ULTRAM   50 mg, Oral, Every 6 Hours PRN         Changes to Medications      Instructions Start Date   albuterol (2.5 MG/3ML) 0.083% nebulizer  solution  Commonly known as:  PROVENTIL  What changed:  Another medication with the same name was added. Make sure you understand how and when to take each.   2.5 mg, Nebulization, Every 4 Hours PRN      albuterol 108 (90 Base) MCG/ACT inhaler  Commonly known as:  PROVENTIL HFA;VENTOLIN HFA  What changed:  You were already taking a medication with the same name, and this prescription was added. Make sure you understand how and when to take each.   2 puffs, Inhalation, Every 4 Hours PRN         Continue These Medications      Instructions Start Date   aspirin 325 MG tablet   325 mg, Oral, Daily      cetirizine 5 MG tablet  Commonly known as:  zyrTEC   5 mg, Oral, Daily      hydrochlorothiazide 25 MG tablet  Commonly known as:  HYDRODIURIL   25 mg, Oral, Daily      ipratropium-albuterol 0.5-2.5 mg/3 ml nebulizer  Commonly known as:  DUO-NEB   3 mL, Nebulization, Every 4 Hours PRN      lisinopril 10 MG tablet  Commonly known as:  PRINIVIL,ZESTRIL   10 mg, Oral, Daily      metFORMIN 500 MG tablet  Commonly known as:  GLUCOPHAGE   500 mg, Oral, Daily With Breakfast      pravastatin 20 MG tablet  Commonly known as:  PRAVACHOL   20 mg, Oral, Daily      tiotropium 18 MCG per inhalation capsule  Commonly known as:  SPIRIVA   1 capsule, Inhalation, Daily - RT         Stop These Medications    fluticasone-salmeterol 250-50 MCG/DOSE DISKUS  Commonly known as:  ADVAIR              Discharge Disposition:  Home or Self Care    Discharge Diet:  Diet Instructions     Advance Diet As Tolerated             Discharge Activity:   Activity Instructions     Activity as Tolerated               Code Status/Level of Support:  Code Status and Medical Interventions:   Ordered at: 08/15/18 7214     Code Status:    CPR     Medical Interventions (Level of Support Prior to Arrest):    Full       No future appointments.    Additional Instructions for the Follow-ups that You Need to Schedule     Discharge Follow-up with PCP    As directed       Currently Documented PCP:  Provider, No Known  PCP Phone Number:  None    Follow Up Details:  1 week               Time Spent on Discharge:  40 minutes    Electronically signed by Nena Pham MD, 08/19/18, 12:21 PM          Electronically signed by Nena Pham MD at 8/19/2018 12:37 PM     Default Flowsheet Data (08/15/18 0000--08/19/18 2359)     Vital Signs     Row Name  08/19/18  1240  08/19/18  1239  08/19/18  1238  08/19/18  1237  08/19/18  1236   Vital Signs   Temp             Temp src             Pulse             Heart Rate Source             Resp             Resp Rate Source             BP             Noninvasive MAP (mmHg)             BP Location             BP Method             Patient Position             Pain Score             ETCO2 (mmHg)             Oxygen Therapy   SpO2  88 %  86 %  86 %  86 %  86 %   Pulse Oximetry Type             SpO2: Pre-Ductal (Right Hand)             SpO2: Post-Ductal (Left Hand/Foot)             Device (Oxygen Therapy)  room air  room air  room air  room air  room air   $ High Flow Nasal Cannula Set-Up             Flow (L/min)             ETCO2 (mmHg)             $ ETCO2 Daily Assessment (RT Only)             EtCO2 (mm Hg) (Respiratory Monitoring)             O2 Device             Flow (L/min)             Oxygen Concentration (%)             Height and Weight   Height             Height Method             Weight             Weight Method             Estimated Dry Weight             Drug Calculation Weight             Ideal Body Weight (IBW) (kg)             BSA (Calculated - sq m)             BMI (Calculated)             Weight in (lb) to have BMI = 25             Patient Observation   Observations             Row Name  08/19/18  1235  08/19/18  1234  08/19/18  1233  08/19/18  1130  08/19/18  0913   Vital Signs   Temp        97.9 °F (36.6  °C)     Temp src        Oral     Pulse        100  97   Heart Rate Source        Monitor     Resp        18     Resp Rate Source         Visual     BP        131/83  139/78   Noninvasive MAP (mmHg)             BP Location        Left arm     BP Method        Automatic     Patient Position        Lying     Pain Score             ETCO2 (mmHg)             Oxygen Therapy   SpO2  88 %  88 %  89 %  91 %     Pulse Oximetry Type             SpO2: Pre-Ductal (Right Hand)             SpO2: Post-Ductal (Left Hand/Foot)             Device (Oxygen Therapy)  room air  room air  room air       $ High Flow Nasal Cannula Set-Up             Flow (L/min)             ETCO2 (mmHg)             $ ETCO2 Daily Assessment (RT Only)             EtCO2 (mm Hg) (Respiratory Monitoring)             O2 Device             Flow (L/min)             Oxygen Concentration (%)             Height and Weight   Height             Height Method             Weight             Weight Method             Estimated Dry Weight             Drug Calculation Weight             Ideal Body Weight (IBW) (kg)             BSA (Calculated - sq m)             BMI (Calculated)             Weight in (lb) to have BMI = 25             Patient Observation   Observations             Row Name  08/19/18  0845  08/19/18  0800  08/19/18  0712  08/19/18  0600  08/19/18  0435   Vital Signs   Temp      98 °F (36.7 °C)    97.7 °F (36.5  °C)   Temp src      Oral    Oral   Pulse  96    106    94   Heart Rate Source  Monitor    Monitor    Monitor   Resp  18 18 18   Resp Rate Source  Visual    Visual    Visual   BP      139/78    142/79   Noninvasive MAP (mmHg)             BP Location      Left arm    Left arm   BP Method      Automatic    Automatic   Patient Position      Lying    Lying   Pain Score             ETCO2 (mmHg)             Oxygen Therapy   SpO2  90 %    91 %    95 %   Pulse Oximetry Type  Continuous           SpO2: Pre-Ductal (Right Hand)             SpO2: Post-Ductal (Left Hand/Foot)             Device (Oxygen Therapy)  nasal cannula;  humidified  nasal cannula    nasal cannula     $ High Flow Nasal  Cannula Set-Up             Flow (L/min)  3  3    3     ETCO2 (mmHg)             $ ETCO2 Daily Assessment (RT Only)             EtCO2 (mm Hg) (Respiratory Monitoring)             O2 Device             Flow (L/min)             Oxygen Concentration (%)             Height and Weight   Height             Height Method             Weight             Weight Method             Estimated Dry Weight             Drug Calculation Weight             Ideal Body Weight (IBW) (kg)             BSA (Calculated - sq m)             BMI (Calculated)             Weight in (lb) to have BMI = 25             Patient Observation   Observations             Row Name  08/19/18  0400  08/19/18  0200  08/19/18  0000  08/18/18  2200  08/18/18  2102   Vital Signs   Temp             Temp src             Pulse             Heart Rate Source             Resp             Resp Rate Source             BP             Noninvasive MAP (mmHg)             BP Location             BP Method             Patient Position             Pain Score             ETCO2 (mmHg)             Oxygen Therapy   SpO2          93 %   Pulse Oximetry Type             SpO2: Pre-Ductal (Right Hand)             SpO2: Post-Ductal (Left Hand/Foot)             Device (Oxygen Therapy)  nasal cannula  nasal cannula  nasal cannula  nasal cannula  nasal cannula   $ High Flow Nasal Cannula Set-Up             Flow (L/min)  3  2  2  2  3   ETCO2 (mmHg)             $ ETCO2 Daily Assessment (RT Only)             EtCO2 (mm Hg) (Respiratory Monitoring)             O2 Device             Flow (L/min)             Oxygen Concentration (%)             Height and Weight   Height             Height Method             Weight             Weight Method             Estimated Dry Weight             Drug Calculation Weight             Ideal Body Weight (IBW) (kg)             BSA (Calculated - sq m)             BMI (Calculated)             Weight in (lb) to have BMI = 25             Patient Observation    Observations             Row Name  08/18/18  2039  08/18/18  2000  08/18/18  1544  08/18/18  1504  08/18/18  1500   Vital Signs   Temp  98 °F (36.7 °C)      98 °F (36.7 °C)     Temp src  Oral      Oral     Pulse      96  92     Heart Rate Source  Monitor    Monitor  Monitor     Resp  18    20  16     Resp Rate Source  Visual    Visual  Visual     BP  150/86      140/80     Noninvasive MAP (mmHg)             BP Location  Left arm      Left arm     BP Method  Automatic      Automatic     Patient Position  Sitting      Lying     Pain Score             ETCO2 (mmHg)             Oxygen Therapy   SpO2  94 %      95 %     Pulse Oximetry Type      Continuous  Continuous     SpO2: Pre-Ductal (Right Hand)             SpO2: Post-Ductal (Left Hand/Foot)             Device (Oxygen Therapy)    nasal cannula  nasal cannula  nasal cannula  nasal cannula   $ High Flow Nasal Cannula Set-Up             Flow (L/min)    2  3    3   ETCO2 (mmHg)             $ ETCO2 Daily Assessment (RT Only)             EtCO2 (mm Hg) (Respiratory Monitoring)             O2 Device             Flow (L/min)             Oxygen Concentration (%)             Height and Weight   Height             Height Method             Weight             Weight Method             Estimated Dry Weight             Drug Calculation Weight             Ideal Body Weight (IBW) (kg)             BSA (Calculated - sq m)             BMI (Calculated)             Weight in (lb) to have BMI = 25             Patient Observation   Observations             Row Name  08/18/18  1202  08/18/18  1114  08/18/18  0734  08/18/18  0720  08/18/18  0600   Vital Signs   Temp        98 °F (36.7 °C)     Temp src        Oral     Pulse  94  97    100     Heart Rate Source    Monitor    Monitor     Resp  16  16    16     Resp Rate Source    Visual    Visual     BP    139/88    157/88     Noninvasive MAP (mmHg)             BP Location    Left arm    Left arm     BP Method    Automatic    Automatic      Patient Position    Sitting    Lying     Pain Score             ETCO2 (mmHg)             Oxygen Therapy   SpO2  95 %  95 %  90 %  89 %     Pulse Oximetry Type    Continuous    Continuous     SpO2: Pre-Ductal (Right Hand)             SpO2: Post-Ductal (Left Hand/Foot)             Device (Oxygen Therapy)  nasal cannula  nasal cannula  nasal cannula  nasal cannula  room air   $ High Flow Nasal Cannula Set-Up             Flow (L/min)  3    2       ETCO2 (mmHg)             $ ETCO2 Daily Assessment (RT Only)             EtCO2 (mm Hg) (Respiratory Monitoring)             O2 Device             Flow (L/min)             Oxygen Concentration (%)             Height and Weight   Height             Height Method             Weight             Weight Method             Estimated Dry Weight             Drug Calculation Weight             Ideal Body Weight (IBW) (kg)             BSA (Calculated - sq m)             BMI (Calculated)             Weight in (lb) to have BMI = 25             Patient Observation   Observations             Row Name  08/18/18  0415  08/18/18  0400  08/18/18  0200  08/18/18  0000  08/17/18  2200   Vital Signs   Temp  98.1 °F (36.7  °C)           Temp src  Oral           Pulse  94           Heart Rate Source  Monitor           Resp  16           Resp Rate Source  Visual           BP  173/84           Noninvasive MAP (mmHg)  113           BP Location  Left arm           BP Method  Automatic           Patient Position  Lying           Pain Score             ETCO2 (mmHg)             Oxygen Therapy   SpO2  90 %           Pulse Oximetry Type             SpO2: Pre-Ductal (Right Hand)             SpO2: Post-Ductal (Left Hand/Foot)             Device (Oxygen Therapy)    room air  room air  room air  room air   $ High Flow Nasal Cannula Set-Up             Flow (L/min)             ETCO2 (mmHg)             $ ETCO2 Daily Assessment (RT Only)             EtCO2 (mm Hg) (Respiratory Monitoring)             O2 Device              Flow (L/min)             Oxygen Concentration (%)             Height and Weight   Height             Height Method             Weight             Weight Method             Estimated Dry Weight             Drug Calculation Weight             Ideal Body Weight (IBW) (kg)             BSA (Calculated - sq m)             BMI (Calculated)             Weight in (lb) to have BMI = 25             Patient Observation   Observations             Row Name  08/17/18 2000 08/17/18  1910 08/17/18  1855  08/17/18  1710  08/17/18  1652   Vital Signs   Temp    98.4 °F (36.9  °C)      97.7 °F (36.5  °C)   Temp src    Oral      Oral   Pulse    96  93  92  95   Heart Rate Source    Monitor    Monitor  Monitor   Resp    18 18 18 16   Resp Rate Source    Visual    Visual  Visual   BP    114/63      137/75   Noninvasive MAP (mmHg)    78         BP Location    Left arm      Left arm   BP Method    Automatic      Automatic   Patient Position    Lying      Sitting   Pain Score             ETCO2 (mmHg)             Oxygen Therapy   SpO2    93 %  97 %  92 %  94 %   Pulse Oximetry Type    Continuous  Continuous  Continuous  Continuous   SpO2: Pre-Ductal (Right Hand)             SpO2: Post-Ductal (Left Hand/Foot)             Device (Oxygen Therapy)  nasal cannula  nasal cannula  nasal cannula  nasal cannula  nasal cannula   $ High Flow Nasal Cannula Set-Up             Flow (L/min)  2  3  3  3     ETCO2 (mmHg)             $ ETCO2 Daily Assessment (RT Only)             EtCO2 (mm Hg) (Respiratory Monitoring)             O2 Device             Flow (L/min)             Oxygen Concentration (%)             Height and Weight   Height             Height Method             Weight             Weight Method             Estimated Dry Weight             Drug Calculation Weight             Ideal Body Weight (IBW) (kg)             BSA (Calculated - sq m)             BMI (Calculated)             Weight in (lb) to have BMI = 25             Patient  Observation   Observations             Row Name  08/17/18  1600  08/17/18  1400  08/17/18  1226  08/17/18  1135  08/17/18  1000   Vital Signs   Temp        98.6 °F (37 °C)     Temp src        Oral     Pulse      91  99     Heart Rate Source      Monitor  Monitor     Resp      18       Resp Rate Source      Visual       BP        114/69     Noninvasive MAP (mmHg)             BP Location             BP Method             Patient Position             Pain Score             ETCO2 (mmHg)             Oxygen Therapy   SpO2      95 %  91 %     Pulse Oximetry Type      Continuous       SpO2: Pre-Ductal (Right Hand)             SpO2: Post-Ductal (Left Hand/Foot)             Device (Oxygen Therapy)  nasal cannula  nasal cannula  nasal cannula    nasal cannula   $ High Flow Nasal Cannula Set-Up             Flow (L/min)  3  4  4    4   ETCO2 (mmHg)             $ ETCO2 Daily Assessment (RT Only)             EtCO2 (mm Hg) (Respiratory Monitoring)             O2 Device             Flow (L/min)             Oxygen Concentration (%)             Height and Weight   Height             Height Method             Weight             Weight Method             Estimated Dry Weight             Drug Calculation Weight             Ideal Body Weight (IBW) (kg)             BSA (Calculated - sq m)             BMI (Calculated)             Weight in (lb) to have BMI = 25             Patient Observation   Observations             Row Name  08/17/18  0834  08/17/18  0833  08/17/18  0832  08/17/18  0831  08/17/18  0830   Vital Signs   Temp             Temp src             Pulse             Heart Rate Source             Resp             Resp Rate Source             BP             Noninvasive MAP (mmHg)             BP Location             BP Method             Patient Position             Pain Score             ETCO2 (mmHg)             Oxygen Therapy   SpO2  86 %  86 %  86 %  87 %  87 %   Pulse Oximetry Type             SpO2: Pre-Ductal (Right Hand)              SpO2: Post-Ductal (Left Hand/Foot)             Device (Oxygen Therapy)             $ High Flow Nasal Cannula Set-Up             Flow (L/min)             ETCO2 (mmHg)             $ ETCO2 Daily Assessment (RT Only)             EtCO2 (mm Hg) (Respiratory Monitoring)             O2 Device             Flow (L/min)             Oxygen Concentration (%)             Height and Weight   Height             Height Method             Weight             Weight Method             Estimated Dry Weight             Drug Calculation Weight             Ideal Body Weight (IBW) (kg)             BSA (Calculated - sq m)             BMI (Calculated)             Weight in (lb) to have BMI = 25             Patient Observation   Observations             Row Name  08/17/18  0829  08/17/18  0828  08/17/18  0827  08/17/18  0826  08/17/18  0825   Vital Signs   Temp             Temp src             Pulse             Heart Rate Source             Resp             Resp Rate Source             BP             Noninvasive MAP (mmHg)             BP Location             BP Method             Patient Position             Pain Score             ETCO2 (mmHg)             Oxygen Therapy   SpO2  87 %  87 %  87 %  87 %  87 %   Pulse Oximetry Type             SpO2: Pre-Ductal (Right Hand)             SpO2: Post-Ductal (Left Hand/Foot)             Device (Oxygen Therapy)             $ High Flow Nasal Cannula Set-Up             Flow (L/min)             ETCO2 (mmHg)             $ ETCO2 Daily Assessment (RT Only)             EtCO2 (mm Hg) (Respiratory Monitoring)             O2 Device             Flow (L/min)             Oxygen Concentration (%)             Height and Weight   Height             Height Method             Weight             Weight Method             Estimated Dry Weight             Drug Calculation Weight             Ideal Body Weight (IBW) (kg)             BSA (Calculated - sq m)             BMI (Calculated)             Weight in (lb) to have  BMI = 25             Patient Observation   Observations             Row Name  08/17/18  0800  08/17/18  0751  08/17/18  0600  08/17/18  0401  08/17/18  0400   Vital Signs   Temp    99.1 °F (37.3  °C)         Temp src    Oral         Pulse    109         Heart Rate Source    Monitor         Resp             Resp Rate Source             BP    136/77         Noninvasive MAP (mmHg)             BP Location             BP Method             Patient Position             Pain Score             ETCO2 (mmHg)             Oxygen Therapy   SpO2    90 %  89 %  92 %     Pulse Oximetry Type    Continuous    Continuous     SpO2: Pre-Ductal (Right Hand)             SpO2: Post-Ductal (Left Hand/Foot)             Device (Oxygen Therapy)  nasal cannula  nasal cannula  nasal cannula  nasal cannula  nasal cannula     86-88% on 4  liters while  asleep           $ High Flow Nasal Cannula Set-Up             Flow (L/min)  4  4  4  4  4   ETCO2 (mmHg)             $ ETCO2 Daily Assessment (RT Only)             EtCO2 (mm Hg) (Respiratory Monitoring)             O2 Device             Flow (L/min)             Oxygen Concentration (%)             Height and Weight   Height             Height Method             Weight             Weight Method             Estimated Dry Weight             Drug Calculation Weight             Ideal Body Weight (IBW) (kg)             BSA (Calculated - sq m)             BMI (Calculated)             Weight in (lb) to have BMI = 25             Patient Observation   Observations             Row Name  08/17/18  0354  08/17/18  0337  08/17/18  0335  08/17/18  0200  08/17/18  0000   Vital Signs   Temp      98.4 °F (36.9  °C)       Temp src      Oral       Pulse  104           Heart Rate Source  Monitor           Resp  20    20       Resp Rate Source  Visual    Visual       BP      131/75       Noninvasive MAP (mmHg)      99       BP Location      Left arm       BP Method      Automatic       Patient Position      Lying        Pain Score             ETCO2 (mmHg)             Oxygen Therapy   SpO2  89 %    91 %  94 %     Pulse Oximetry Type  Continuous           SpO2: Pre-Ductal (Right Hand)             SpO2: Post-Ductal (Left Hand/Foot)             Device (Oxygen Therapy)  nasal cannula  nasal cannula    nasal cannula  nasal cannula   $ High Flow Nasal Cannula Set-Up             Flow (L/min)  3  3    3  2   ETCO2 (mmHg)             $ ETCO2 Daily Assessment (RT Only)             EtCO2 (mm Hg) (Respiratory Monitoring)             O2 Device             Flow (L/min)             Oxygen Concentration (%)             Height and Weight   Height             Height Method             Weight             Weight Method             Estimated Dry Weight             Drug Calculation Weight             Ideal Body Weight (IBW) (kg)             BSA (Calculated - sq m)             BMI (Calculated)             Weight in (lb) to have BMI = 25             Patient Observation   Observations             Row Name  08/16/18  2200 08/16/18 2000 08/16/18  1940 08/16/18  1935  08/16/18  1800   Vital Signs   Temp        97.9 °F (36.6  °C)     Temp src        Oral     Pulse             Heart Rate Source             Resp        18     Resp Rate Source        Visual     BP        108/64     Noninvasive MAP (mmHg)        80     BP Location        Left arm     BP Method        Automatic     Patient Position        Lying     Pain Score             ETCO2 (mmHg)             Oxygen Therapy   SpO2    99 %  95 %  95 %     Pulse Oximetry Type             SpO2: Pre-Ductal (Right Hand)             SpO2: Post-Ductal (Left Hand/Foot)             Device (Oxygen Therapy)  nasal cannula  nasal cannula  nasal cannula    nasal cannula   $ High Flow Nasal Cannula Set-Up             Flow (L/min)  2  2  2    2   ETCO2 (mmHg)             $ ETCO2 Daily Assessment (RT Only)             EtCO2 (mm Hg) (Respiratory Monitoring)             O2 Device             Flow (L/min)             Oxygen  Concentration (%)             Height and Weight   Height             Height Method             Weight             Weight Method             Estimated Dry Weight             Drug Calculation Weight             Ideal Body Weight (IBW) (kg)             BSA (Calculated - sq m)             BMI (Calculated)             Weight in (lb) to have BMI = 25             Patient Observation   Observations             Row Name  08/16/18  1717  08/16/18  1600  08/16/18  1400  08/16/18  1200  08/16/18  1131   Vital Signs   Temp  97.6 °F (36.4  °C)        97.9 °F (36.6  °C)   Temp src  Oral        Oral   Pulse  88        88   Heart Rate Source  Monitor        Monitor   Resp  18        18   Resp Rate Source  Visual        Visual   BP  107/56        123/72   Noninvasive MAP (mmHg)             BP Location  Left arm        Left arm   BP Method  Automatic        Automatic   Patient Position  Lying        Lying   Pain Score             ETCO2 (mmHg)             Oxygen Therapy   SpO2  92 %        94 %   Pulse Oximetry Type             SpO2: Pre-Ductal (Right Hand)             SpO2: Post-Ductal (Left Hand/Foot)             Device (Oxygen Therapy)    nasal cannula  nasal cannula  nasal cannula     $ High Flow Nasal Cannula Set-Up             Flow (L/min)    2  2  2     ETCO2 (mmHg)             $ ETCO2 Daily Assessment (RT Only)             EtCO2 (mm Hg) (Respiratory Monitoring)             O2 Device             Flow (L/min)             Oxygen Concentration (%)             Height and Weight   Height             Height Method             Weight             Weight Method             Estimated Dry Weight             Drug Calculation Weight             Ideal Body Weight (IBW) (kg)             BSA (Calculated - sq m)             BMI (Calculated)             Weight in (lb) to have BMI = 25             Patient Observation   Observations             Row Name  08/16/18  1000  08/16/18  0935  08/16/18  0800  08/16/18  0722  08/16/18  0600   Vital Signs    Temp        97.6 °F (36.4  °C)     Temp src        Oral     Pulse        91     Heart Rate Source        Monitor     Resp    20    18     Resp Rate Source    Visual    Visual     BP        142/73     Noninvasive MAP (mmHg)             BP Location        Left arm     BP Method        Automatic     Patient Position        Lying     Pain Score             ETCO2 (mmHg)             Oxygen Therapy   SpO2    91 %    93 %  90 %   Pulse Oximetry Type    Continuous         SpO2: Pre-Ductal (Right Hand)             SpO2: Post-Ductal (Left Hand/Foot)             Device (Oxygen Therapy)  nasal cannula  nasal cannula  nasal cannula    nasal cannula   $ High Flow Nasal Cannula Set-Up             Flow (L/min)  2  2  2    2   ETCO2 (mmHg)             $ ETCO2 Daily Assessment (RT Only)             EtCO2 (mm Hg) (Respiratory Monitoring)             O2 Device             Flow (L/min)             Oxygen Concentration (%)             Height and Weight   Height             Height Method             Weight             Weight Method             Estimated Dry Weight             Drug Calculation Weight             Ideal Body Weight (IBW) (kg)             BSA (Calculated - sq m)             BMI (Calculated)             Weight in (lb) to have BMI = 25             Patient Observation   Observations             Row Name  08/16/18  0400  08/16/18  0320  08/16/18  0155  08/16/18  0000  08/15/18  2310   Vital Signs   Temp    97.8 °F (36.6  °C)      98.6 °F (37 °C)   Temp src    Oral      Oral   Pulse             Heart Rate Source             Resp    20      20   Resp Rate Source    Visual      Visual   BP    169/90      120/75   Noninvasive MAP (mmHg)    123      92   BP Location    Left arm      Left arm   BP Method    Manual      Automatic   Patient Position    Lying      Lying   Pain Score             ETCO2 (mmHg)             Oxygen Therapy   SpO2    99 %  91 %  98 %  91 %   Pulse Oximetry Type             SpO2: Pre-Ductal (Right Hand)           "   SpO2: Post-Ductal (Left Hand/Foot)             Device (Oxygen Therapy)  nasal cannula    nasal cannula  nasal cannula     $ High Flow Nasal Cannula Set-Up             Flow (L/min)  2    2  2     ETCO2 (mmHg)             $ ETCO2 Daily Assessment (RT Only)             EtCO2 (mm Hg) (Respiratory Monitoring)             O2 Device             Flow (L/min)             Oxygen Concentration (%)             Height and Weight   Height             Height Method             Weight             Weight Method             Estimated Dry Weight             Drug Calculation Weight             Ideal Body Weight (IBW) (kg)             BSA (Calculated - sq m)             BMI (Calculated)             Weight in (lb) to have BMI = 25             Patient Observation   Observations             Row Name  08/15/18  2200  08/15/18  2128  08/15/18  2040  08/15/18  2030  08/15/18  1930   Vital Signs   Temp        98.5 °F (36.9  °C)     Temp src        Oral     Pulse    98    104  105   Heart Rate Source    Monitor    Monitor     Resp    22    20     Resp Rate Source    Visual    Visual     BP        142/78  136/66   Noninvasive MAP (mmHg)        102  82   BP Location        Left arm     BP Method        Automatic     Patient Position        Sitting     Pain Score             ETCO2 (mmHg)             Oxygen Therapy   SpO2    94 %    91 %  93 %   Pulse Oximetry Type    Continuous         SpO2: Pre-Ductal (Right Hand)             SpO2: Post-Ductal (Left Hand/Foot)             Device (Oxygen Therapy)  nasal cannula  nasal cannula  nasal cannula       $ High Flow Nasal Cannula Set-Up             Flow (L/min)  2  2  2       ETCO2 (mmHg)             $ ETCO2 Daily Assessment (RT Only)             EtCO2 (mm Hg) (Respiratory Monitoring)             O2 Device             Flow (L/min)             Oxygen Concentration (%)             Height and Weight   Height        152.4 cm (60\")     Height Method        Stated     Weight        78.6 kg (173 lb  4.8 oz)   " "  Weight Method        Bed scale     Estimated Dry Weight             Drug Calculation Weight             Ideal Body Weight (IBW) (kg)        45.86     BSA (Calculated - sq m)        1.76 sq meters     BMI (Calculated)        33.8     Weight in (lb) to have BMI = 25        127.7     Patient Observation   Observations             Row Name  08/15/18  1900  08/15/18  1730  08/15/18  1715  08/15/18  1252     Vital Signs   Temp        98.7 °F (37.1  °C)     Temp src        Oral     Pulse  106  108  108  115     Heart Rate Source      Monitor  Monitor     Resp        20     Resp Rate Source        Visual     BP  150/80  161/91    140/66     Noninvasive MAP (mmHg)  96  111         BP Location        Left arm     BP Method        Automatic     Patient Position        Sitting     Pain Score             ETCO2 (mmHg)             Oxygen Therapy   SpO2  92 %  96 %  98 %  92 %     Pulse Oximetry Type      Continuous  Intermittent     SpO2: Pre-Ductal (Right Hand)             SpO2: Post-Ductal (Left Hand/Foot)             Device (Oxygen Therapy)      nasal cannula       $ High Flow Nasal Cannula Set-Up             Flow (L/min)      2       ETCO2 (mmHg)             $ ETCO2 Daily Assessment (RT Only)             EtCO2 (mm Hg) (Respiratory Monitoring)             O2 Device             Flow (L/min)             Oxygen Concentration (%)             Height and Weight   Height        152.4 cm (60\")     Height Method        Stated     Weight        73.5 kg (162  lb)     Weight Method             Estimated Dry Weight             Drug Calculation Weight             Ideal Body Weight (IBW) (kg)        45.86     BSA (Calculated - sq m)        1.71 sq meters     BMI (Calculated)        31.6     Weight in (lb) to have BMI = 25        127.7     Patient Observation   Observations                 "

## 2018-08-19 NOTE — DISCHARGE PLACEMENT REQUEST
"Gladys Iglesias (58 y.o. Female)     Date of Birth Social Security Number Address Home Phone MRN    1960  [address] [place] [address] [phone] [id number]    Amish Marital Status          None Single       Admission Date Admission Type Admitting Provider Attending Provider Department, Room/Bed    8/15/18 Emergency Nena Pham MD  [facility] 2F, S214/1    Discharge Date Discharge Disposition Discharge Destination        8/19/2018 Home or Self Care              Attending Provider:  (none)   Allergies:  Propoxyphene-acetaminophen    Isolation:  None   Infection:  None   Code Status:  CPR    Ht:  152.4 cm (60\")   Wt:  78.6 kg (173 lb 4.8 oz)    Admission Cmt:  None   Principal Problem:  Cellulitis of right lower extremity [L03.115]                 Active Insurance as of 8/15/2018     Primary Coverage     Payor Plan Insurance Group Employer/Plan Group    [employer] [place] [employer] [place]      Payor Plan Address Payor Plan Phone Number Effective From Effective To    [address] [address] [phone] 8/15/2018     [facility] [id number]       Subscriber Name Subscriber Birth Date Member ID       GLADYS IGLESIAS 1960 [id number]                 Emergency Contacts      (Rel.) Home Phone Work Phone Mobile Phone    Ludin Iglesias (Son) [phone] -- --            Insurance Information                [facility]/[employer] MEDICAID Phone: [phone]    Subscriber: Gladys Iglesias Subscriber#: [id number]    Group#:  Precert#:         Row Name  08/19/18  1240  08/19/18  1239  08/19/18  1238  08/19/18  1237  08/19/18  1236   Vital Signs   Temp             Temp src             Pulse             Heart Rate Source             Resp             Resp Rate Source             BP             Noninvasive MAP (mmHg)             BP Location             BP Method             Patient Position             Pain Score             BMI (Calculated)             ETCO2 (mmHg)             Oxygen Therapy "   SpO2  88 %  86 %  86 %  86 %  86 %   Pulse Oximetry Type             SpO2: Pre-Ductal (Right Hand)             SpO2: Post-Ductal (Left Hand/Foot)             Device (Oxygen Therapy)  room air  room air  room air  room air  room air     resting  resting  resting  resting  resting

## 2018-08-19 NOTE — PLAN OF CARE
Problem: Patient Care Overview  Goal: Plan of Care Review  Outcome: Ongoing (interventions implemented as appropriate)   08/19/18 0321   Coping/Psychosocial   Plan of Care Reviewed With patient   Plan of Care Review   Progress no change   OTHER   Outcome Summary Patient is still independent with ADL's       Problem: Infection, Risk/Actual (Adult)  Goal: Identify Related Risk Factors and Signs and Symptoms  Outcome: Ongoing (interventions implemented as appropriate)   08/19/18 0321   Infection, Risk/Actual (Adult)   Related Risk Factors (Infection, Risk/Actual) skin integrity impairment;tissue perfusion altered   Signs and Symptoms (Infection, Risk/Actual) pain

## 2018-08-19 NOTE — DISCHARGE SUMMARY
[facility] Medicine Services  DISCHARGE SUMMARY    Patient Name: Gladys Iglesias  : 1960  MRN: [id number]    Date of Admission: 8/15/2018  Date of Discharge:  18  Primary Care Physician: Provider, No Known    Consults     Date and Time Order Name Status Description    8/15/2018 2320 Inpatient Infectious Diseases Consult Completed         Hospital Course     Presenting Problem:   Cellulitis of right lower extremity [L03.115]    Active Hospital Problems    Diagnosis Date Noted   • **Cellulitis of right lower extremity [L03.115] 08/15/2018   • COPD (chronic obstructive pulmonary disease) (CMS/[facility]) [J44.9] 08/15/2018   • Diabetes mellitus (CMS/[facility]) [E11.9] 08/15/2018   • Coronary artery disease [I25.10] 08/15/2018   • Hyperlipidemia [E78.5] 08/15/2018   • Hypertension [I10] 08/15/2018   • Smoker [F17.200] 08/15/2018      Resolved Hospital Problems    Diagnosis Date Noted Date Resolved   No resolved problems to display.          Hospital Course:  Gladys Iglesias is a 58 y.o. female with hx of DM2, HTN, HLD, COPD, and prior episodes of leg cellulitis presents due to redness, swelling, and pain in her right lower extremity. She has hx of recurrent cellulitis. Has PVD with hx of aortobifemoral bypass, and thus has high risk of bacteremia and bypass infection. She had WBC 11.98 on admission. Duplex ruled out DVT. Started on Vanc and Rocephin. Infectious Disease was consulted. Blood cultures remained no growth. Dr. Muir recommends 14 days of Cefuroxime 500 mg BID. Redness and swelling have improved but she continues to have deep erythema which I think will take time to fade. She should continue to elevate the right leg at home. If she has any worsening swelling, redness, pain, or fevers at home she was instructed to return to the ER for evaluation.     She said she was just about out of many of her home medications, as she is in the process of getting in with a new PCP but appointment is  "not till beginning of October. I will give her a 1 month supply of her meds to help her through.     Patient required O2 during the day here, and she typically only wears it at night at home. CXR performed and showed some small bilateral effusions and increased markings however BNP was normal. She says she used to be on 24/7 home oxygen but \"took myself off of it.\" I recommend she remain on O2 during the day as well. She has remained on a nicotine patch this admission (last cigarette was day prior to admission), and has not had any craving for cigarettes. She is motivated to remain tobacco free, and I of course strongly encouraged this. I will send a script for nicotine patch to her pharmacy, per her request.       Discharge Follow Up Recommendations for labs/diagnostics:  F/U with PCP in 1 week or as scheduled    Day of Discharge     HPI:   No complaints, swelling has dramatically improved in her leg since yesterday. Less warm. Eager for discharge home.    Review of Systems  Gen-no fevers, no chills  CV-no chest pain, no palpitations  Resp-no cough, no dyspnea  GI-no N/V/D, no abd pain      Otherwise ROS is negative except as mentioned in the HPI.    Vital Signs:   Temp:  [97.7 °F (36.5 °C)-98 °F (36.7 °C)] 97.9 °F (36.6 °C)  Heart Rate:  [] 100  Resp:  [16-20] 18  BP: (131-150)/(78-86) 131/83     Physical Exam:  Gen-no acute distress  CV-RRR, S1 S2 normal, no m/r/g  Resp-mildly diminished at the bases, no wheezes, normal effort  Abd-soft, NT, ND, +BS  Ext-RLE erythema and warmth improving from deep erythema persists in lower leg, swelling has improved significantly  Neuro-A&Ox3, no focal deficits  Psych-appropriate mood      Pertinent  and/or Most Recent Results       Results from last 7 days  Lab Units 08/18/18  0759 08/17/18  0911 08/16/18  0445 08/15/18  1357 08/15/18  1356   WBC 10*3/mm3 9.11 9.17  --  11.98*  --    HEMOGLOBIN g/dL 13.4 12.4  --  13.5  --    HEMATOCRIT % 42.7 40.7  --  43.7  --  "   PLATELETS 10*3/mm3 321 221  --  234  --    SODIUM mmol/L 142 137  --   --  138   POTASSIUM mmol/L 3.6 3.5 3.7  --  3.2*   CHLORIDE mmol/L 97* 99  --   --  101   CO2 mmol/L 36.0* 36.0*  --   --  26.0   BUN mg/dL 9 9  --   --  11   CREATININE mg/dL 0.62 0.70  --   --  0.78   GLUCOSE mg/dL 126* 182*  --   --  178*   CALCIUM mg/dL 9.2 8.7  --   --  8.6*       Results from last 7 days  Lab Units 08/15/18  1356   BILIRUBIN mg/dL 0.3   ALK PHOS U/L 106*   ALT (SGPT) U/L 17   AST (SGOT) U/L 12           Invalid input(s): TG, LDLCALC, LDLREALC    Results from last 7 days  Lab Units 08/17/18  1301 08/16/18  0903   HEMOGLOBIN A1C %  --  6.90*   BNP pg/mL 74.0  --      Brief Urine Lab Results     None          Microbiology Results Abnormal     Procedure Component Value - Date/Time    Blood Culture - Blood, [[id number]]  (Normal) Collected:  08/16/18 1610    Lab Status:  Preliminary result Specimen:  Blood from Hand, Right Updated:  08/18/18 1715     Blood Culture No growth at 2 days    Blood Culture - Blood, [[id number]]  (Normal) Collected:  08/16/18 1600    Lab Status:  Preliminary result Specimen:  Blood from Hand, Left Updated:  08/18/18 1715     Blood Culture No growth at 2 days          Imaging Results (all)     Procedure Component Value Units Date/Time    XR Chest 1 View [[id number]] Collected:  08/17/18 1028     Updated:  08/17/18 1032    Narrative:       EXAMINATION: XR CHEST 1 VW-      INDICATION: hypoxia; L03.115-Cellulitis of right lower limb;  I73.9-Peripheral vascular disease, unspecified      COMPARISON: 03/19/2013.     FINDINGS: Portable chest reveals heart to be borderline enlarged. Patchy  ill-defined opacification seen at the lower lung fields bilaterally.  Small bilateral pleural effusions. Degenerative change is seen within  the spine. Pulmonary vascularity is within normal limits.           Impression:       Increased markings seen at the lung bases bilaterally with  small bilateral pleural effusions.      D:  08/17/2018  E:  08/17/2018     This report was finalized on 8/17/2018 10:30 AM by Dr. Justina Henley MD.             Results for orders placed during the hospital encounter of 08/15/18   Duplex Venous Lower Extremity - Right CAR    Narrative · Normal right lower extremity venous duplex scan.  · Incidental moderate right inguinal adenopathy noted.          Results for orders placed during the hospital encounter of 08/15/18   Duplex Venous Lower Extremity - Right CAR    Narrative · Normal right lower extremity venous duplex scan.  · Incidental moderate right inguinal adenopathy noted.                Order Current Status    Blood Culture - Blood, Preliminary result    Blood Culture - Blood, Preliminary result        Discharge Details        Discharge Medications      New Medications      Instructions Start Date   budesonide-formoterol 160-4.5 MCG/ACT inhaler  Commonly known as:  SYMBICORT   2 puffs, Inhalation, 2 Times Daily - RT      cefuroxime 250 MG tablet  Commonly known as:  CEFTIN   500 mg, Oral, 2 Times Daily      nicotine 21 MG/24HR patch  Commonly known as:  NICODERM CQ   1 patch, Transdermal, Every 24 Hours Scheduled      traMADol 50 MG tablet  Commonly known as:  ULTRAM   50 mg, Oral, Every 6 Hours PRN         Changes to Medications      Instructions Start Date   albuterol (2.5 MG/3ML) 0.083% nebulizer solution  Commonly known as:  PROVENTIL  What changed:  Another medication with the same name was added. Make sure you understand how and when to take each.   2.5 mg, Nebulization, Every 4 Hours PRN      albuterol 108 (90 Base) MCG/ACT inhaler  Commonly known as:  PROVENTIL HFA;VENTOLIN HFA  What changed:  You were already taking a medication with the same name, and this prescription was added. Make sure you understand how and when to take each.   2 puffs, Inhalation, Every 4 Hours PRN         Continue These Medications      Instructions Start Date   aspirin 325 MG tablet   325 mg, Oral, Daily       cetirizine 5 MG tablet  Commonly known as:  zyrTEC   5 mg, Oral, Daily      hydrochlorothiazide 25 MG tablet  Commonly known as:  HYDRODIURIL   25 mg, Oral, Daily      ipratropium-albuterol 0.5-2.5 mg/3 ml nebulizer  Commonly known as:  DUO-NEB   3 mL, Nebulization, Every 4 Hours PRN      lisinopril 10 MG tablet  Commonly known as:  PRINIVIL,ZESTRIL   10 mg, Oral, Daily      metFORMIN 500 MG tablet  Commonly known as:  GLUCOPHAGE   500 mg, Oral, Daily With Breakfast      pravastatin 20 MG tablet  Commonly known as:  PRAVACHOL   20 mg, Oral, Daily      tiotropium 18 MCG per inhalation capsule  Commonly known as:  SPIRIVA   1 capsule, Inhalation, Daily - RT         Stop These Medications    fluticasone-salmeterol 250-50 MCG/DOSE DISKUS  Commonly known as:  ADVAIR              Discharge Disposition:  Home or Self Care    Discharge Diet:  Diet Instructions     Advance Diet As Tolerated             Discharge Activity:   Activity Instructions     Activity as Tolerated               Code Status/Level of Support:  Code Status and Medical Interventions:   Ordered at: 08/15/18 4798     Code Status:    CPR     Medical Interventions (Level of Support Prior to Arrest):    Full       No future appointments.    Additional Instructions for the Follow-ups that You Need to Schedule     Discharge Follow-up with PCP    As directed      Currently Documented PCP:  Provider, No Known  PCP Phone Number:  None    Follow Up Details:  1 week               Time Spent on Discharge:  40 minutes    Electronically signed by Nena Pham MD, 08/19/18, 12:21 PM

## 2018-08-19 NOTE — PROGRESS NOTES
"Case Management Discharge Note    Final Note: JUANIS spoke w pt who uses [facility] for PRN home O2. CM ordered O2 for scheduled all day/night use from [facility] and spoke w Felicia. JUANIS faxed order and update to [phone] per request. Pt refused a portable when offered and encouraged to have. She stated \"I live 5 min from here I don't need a portable\". No other needs at this time.    Destination     No service has been selected for the patient.      Durable Medical Equipment     No service has been selected for the patient.      Dialysis/Infusion     No service has been selected for the patient.      Home Medical Care     No service has been selected for the patient.      Social Care     No service has been selected for the patient.             Final Discharge Disposition Code: 01 - home or self-care  "

## 2018-08-20 ENCOUNTER — READMISSION MANAGEMENT (OUTPATIENT)
Dept: CALL CENTER | Facility: HOSPITAL | Age: 58
End: 2018-08-20

## 2018-08-20 NOTE — OUTREACH NOTE
Prep Survey      Responses   Facility patient discharged from?  [place]   Is patient eligible?  Yes   Discharge diagnosis  Cellulitis of RLE   Does the patient have one of the following disease processes/diagnoses(primary or secondary)?  Other   Does the patient have Home health ordered?  No   Is there a DME ordered?  Yes   What DME was ordered?  [facility]   Prep survey completed?  Yes          Shahrzad Lebron RN

## 2018-08-21 LAB
BACTERIA SPEC AEROBE CULT: NORMAL
BACTERIA SPEC AEROBE CULT: NORMAL

## 2018-08-24 ENCOUNTER — READMISSION MANAGEMENT (OUTPATIENT)
Dept: CALL CENTER | Facility: HOSPITAL | Age: 58
End: 2018-08-24

## 2018-08-24 NOTE — OUTREACH NOTE
Medical Week 1 Survey      Responses   Facility patient discharged from?  [place]   Does the patient have one of the following disease processes/diagnoses(primary or secondary)?  Other   Is there a successful TCM telephone encounter documented?  No   Week 1 attempt successful?  Yes   Call start time  1105   Call end time  1119   Discharge diagnosis  Cellulitis of RLE   Meds reviewed with patient/caregiver?  Yes   Is the patient having any side effects they believe may be caused by any medication additions or changes?  No   Does the patient have all medications ordered at discharge?  Yes   Is the patient taking all medications as directed (includes completed medication regime)?  Yes   Does the patient have a primary care provider?   Yes   Does the patient have an appointment with their PCP within 7 days of discharge?  Greater than 7 days   Comments regarding PCP  PCP appt in Oct. She wants to be seen sooner. Gave her PCP liaison number.   What is preventing the patient from scheduling follow up appointments within 7 days of discharge?  Earlier appointment not available   Urgent appointment interventions  Facilitated patient appointment   Has the patient kept scheduled appointments due by today?  N/A   Comments  She will try to be seen sooner.   Has home health visited the patient within 72 hours of discharge?  N/A   What DME was ordered?  [facility]   Has all DME been delivered?  Yes   Psychosocial issues?  No   Comments  She is keeping away from cigarettes. States 2/3 blisters are looking better. She is living in a situation with unruly dogs. Advised that she keep the dogs away from her wounds.   Did the patient receive a copy of their discharge instructions?  Yes   Nursing interventions  Reviewed instructions with patient   What is the patient's perception of their health status since discharge?  Improving   Is the patient/caregiver able to teach back signs and symptoms related to disease process for when to  call PCP?  Yes   Is the patient/caregiver able to teach back signs and symptoms related to disease process for when to call 911?  Yes   Is the patient/caregiver able to teach back the hierarchy of who to call/visit for symptoms/problems? PCP, Specialist, Home health nurse, Urgent Care, ED, 911  Yes   If the patient is a current smoker, are they able to teach back resources for cessation?  Smoking cessation medications   Additional teach back comments  Reviewed s/s of worsening infection.   Week 1 call completed?  Yes          Jerald Jacobs RN

## 2018-08-31 ENCOUNTER — READMISSION MANAGEMENT (OUTPATIENT)
Dept: CALL CENTER | Facility: HOSPITAL | Age: 58
End: 2018-08-31

## 2018-09-12 ENCOUNTER — READMISSION MANAGEMENT (OUTPATIENT)
Dept: CALL CENTER | Facility: HOSPITAL | Age: 58
End: 2018-09-12

## 2018-09-12 NOTE — OUTREACH NOTE
Medical Week 3 Survey      Responses   Facility patient discharged from?  [place]   Does the patient have one of the following disease processes/diagnoses(primary or secondary)?  Other   Week 3 attempt successful?  Yes   Call start time  1121   Call end time  1126   Meds reviewed with patient/caregiver?  Yes   Is the patient having any side effects they believe may be caused by any medication additions or changes?  No   Does the patient have all medications ordered at discharge?  Yes   Is the patient taking all medications as directed (includes completed medication regime)?  Yes   Does the patient have a primary care provider?   Yes   Comments regarding PCP  [facility]   Does the patient have an appointment with their PCP within 7 days of discharge?  Greater than 7 days   What is preventing the patient from scheduling follow up appointments within 7 days of discharge?  Earlier appointment not available   Has the patient kept scheduled appointments due by today?  N/A   Has home health visited the patient within 72 hours of discharge?  N/A   Has all DME been delivered?  Yes   Psychosocial issues?  No   Comments  States her wounds are healing-denies any s/s of worsening infection. States will keep appt with PCP.   Did the patient receive a copy of their discharge instructions?  Yes   Nursing interventions  Reviewed instructions with patient, Educated on MyChart   What is the patient's perception of their health status since discharge?  Improving   Is the patient/caregiver able to teach back signs and symptoms related to disease process for when to call PCP?  Yes   Is the patient/caregiver able to teach back signs and symptoms related to disease process for when to call 911?  Yes   Is the patient/caregiver able to teach back the hierarchy of who to call/visit for symptoms/problems? PCP, Specialist, Home health nurse, Urgent Care, ED, 911  Yes   If the patient is a current smoker, are they able to teach back  resources for cessation?  Smoking cessation medications   Week 3 Call Completed?  Yes          Marely Hoskins, RN

## 2018-09-21 ENCOUNTER — READMISSION MANAGEMENT (OUTPATIENT)
Dept: CALL CENTER | Facility: HOSPITAL | Age: 58
End: 2018-09-21

## 2018-09-21 NOTE — OUTREACH NOTE
Medical Week 4 Survey      Responses   Facility patient discharged from?  [place]   Does the patient have one of the following disease processes/diagnoses(primary or secondary)?  Other   Week 4 attempt successful?  Yes   Call start time  1707   Call end time  1712   Discharge diagnosis  Cellulitis of RLE   Is patient permission given to speak with other caregiver?  No   Meds reviewed with patient/caregiver?  Yes   Is the patient having any side effects they believe may be caused by any medication additions or changes?  No   Is the patient taking all medications as directed (includes completed medication regime)?  Yes   Has the patient kept scheduled appointments due by today?  N/A   Nursing Interventions  Advised patient to keep appointment   Comments  Patient states that she has New PCP appt at [facility] in October.    Is the patient still receiving Home Health Services?  N/A   Psychosocial issues?  No   Comments  Patient states leg healed, no redness, edema.    What is the patient's perception of their health status since discharge?  Returned to baseline/stable   Is the patient/caregiver able to teach back signs and symptoms related to disease process for when to call PCP?  Yes   Is the patient/caregiver able to teach back signs and symptoms related to disease process for when to call 911?  Yes   Is the patient/caregiver able to teach back the hierarchy of who to call/visit for symptoms/problems? PCP, Specialist, Home health nurse, Urgent Care, ED, 911  Yes   If the patient is a current smoker, are they able to teach back resources for cessation?  Smoking cessation medications   Week 4 Call Completed?  Yes   Would the patient like one additional call?  No   Graduated  Yes   Did the patient feel the follow up calls were helpful during their recovery period?  Yes   Was the number of calls appropriate?  Yes   Wrap up additional comments  Patient has new PCP appt scheduled. States leg cellulitis has healed.  Denies new concerns or questions today.           Marilu Parsons RN

## 2019-05-01 ENCOUNTER — APPOINTMENT (OUTPATIENT)
Dept: GENERAL RADIOLOGY | Facility: HOSPITAL | Age: 59
End: 2019-05-01

## 2019-05-01 ENCOUNTER — HOSPITAL ENCOUNTER (INPATIENT)
Facility: HOSPITAL | Age: 59
LOS: 2 days | Discharge: HOME | End: 2019-05-03
Attending: EMERGENCY MEDICINE | Admitting: INTERNAL MEDICINE

## 2019-05-01 DIAGNOSIS — Z86.14 HISTORY OF MRSA INFECTION: ICD-10-CM

## 2019-05-01 DIAGNOSIS — I73.9 PERIPHERAL VASCULAR DISEASE (HCC): ICD-10-CM

## 2019-05-01 DIAGNOSIS — L03.115 CELLULITIS OF RIGHT LEG: Primary | ICD-10-CM

## 2019-05-01 DIAGNOSIS — J44.9 CHRONIC OBSTRUCTIVE PULMONARY DISEASE, UNSPECIFIED COPD TYPE (HCC): ICD-10-CM

## 2019-05-01 DIAGNOSIS — E11.9 TYPE 2 DIABETES MELLITUS WITHOUT COMPLICATION, WITHOUT LONG-TERM CURRENT USE OF INSULIN (HCC): ICD-10-CM

## 2019-05-01 DIAGNOSIS — F17.200 TOBACCO DEPENDENCE: ICD-10-CM

## 2019-05-01 DIAGNOSIS — R00.0 TACHYCARDIA: ICD-10-CM

## 2019-05-01 PROBLEM — A41.9 SEPSIS (HCC): Status: ACTIVE | Noted: 2019-05-01

## 2019-05-01 PROBLEM — L03.90 CELLULITIS: Status: ACTIVE | Noted: 2019-05-01

## 2019-05-01 LAB
ALBUMIN SERPL-MCNC: 3.6 G/DL (ref 3.5–5.2)
ALBUMIN/GLOB SERPL: 1.1 G/DL
ALP SERPL-CCNC: 100 U/L (ref 39–117)
ALT SERPL W P-5'-P-CCNC: 9 U/L (ref 1–33)
ANION GAP SERPL CALCULATED.3IONS-SCNC: 12 MMOL/L
AST SERPL-CCNC: 12 U/L (ref 1–32)
BASOPHILS # BLD AUTO: 0.03 10*3/MM3 (ref 0–0.2)
BASOPHILS NFR BLD AUTO: 0.2 % (ref 0–1.5)
BILIRUB SERPL-MCNC: 0.6 MG/DL (ref 0.2–1.2)
BUN BLD-MCNC: 17 MG/DL (ref 6–20)
BUN/CREAT SERPL: 21.8 (ref 7–25)
CALCIUM SPEC-SCNC: 8.6 MG/DL (ref 8.6–10.5)
CHLORIDE SERPL-SCNC: 95 MMOL/L (ref 98–107)
CO2 SERPL-SCNC: 25 MMOL/L (ref 22–29)
CREAT BLD-MCNC: 0.78 MG/DL (ref 0.57–1)
D-LACTATE SERPL-SCNC: 0.7 MMOL/L (ref 0.5–2)
DEPRECATED RDW RBC AUTO: 46.5 FL (ref 37–54)
EOSINOPHIL # BLD AUTO: 0.05 10*3/MM3 (ref 0–0.4)
EOSINOPHIL NFR BLD AUTO: 0.3 % (ref 0.3–6.2)
ERYTHROCYTE [DISTWIDTH] IN BLOOD BY AUTOMATED COUNT: 14.9 % (ref 12.3–15.4)
GFR SERPL CREATININE-BSD FRML MDRD: 76 ML/MIN/1.73
GLOBULIN UR ELPH-MCNC: 3.4 GM/DL
GLUCOSE BLD-MCNC: 111 MG/DL (ref 65–99)
HCT VFR BLD AUTO: 45.9 % (ref 34–46.6)
HGB BLD-MCNC: 14.3 G/DL (ref 12–15.9)
IMM GRANULOCYTES # BLD AUTO: 0.04 10*3/MM3 (ref 0–0.05)
IMM GRANULOCYTES NFR BLD AUTO: 0.2 % (ref 0–0.5)
LYMPHOCYTES # BLD AUTO: 1.52 10*3/MM3 (ref 0.7–3.1)
LYMPHOCYTES NFR BLD AUTO: 8.9 % (ref 19.6–45.3)
MCH RBC QN AUTO: 26.4 PG (ref 26.6–33)
MCHC RBC AUTO-ENTMCNC: 31.2 G/DL (ref 31.5–35.7)
MCV RBC AUTO: 84.8 FL (ref 79–97)
MONOCYTES # BLD AUTO: 0.73 10*3/MM3 (ref 0.1–0.9)
MONOCYTES NFR BLD AUTO: 4.3 % (ref 5–12)
NEUTROPHILS # BLD AUTO: 14.69 10*3/MM3 (ref 1.7–7)
NEUTROPHILS NFR BLD AUTO: 86.3 % (ref 42.7–76)
PLATELET # BLD AUTO: 237 10*3/MM3 (ref 140–450)
PMV BLD AUTO: 9.7 FL (ref 6–12)
POTASSIUM BLD-SCNC: 4.1 MMOL/L (ref 3.5–5.2)
PROT SERPL-MCNC: 7 G/DL (ref 6–8.5)
RBC # BLD AUTO: 5.41 10*6/MM3 (ref 3.77–5.28)
SODIUM BLD-SCNC: 132 MMOL/L (ref 136–145)
WBC NRBC COR # BLD: 17.02 10*3/MM3 (ref 3.4–10.8)

## 2019-05-01 PROCEDURE — 94799 UNLISTED PULMONARY SVC/PX: CPT

## 2019-05-01 PROCEDURE — 25010000002 VANCOMYCIN 10 G RECONSTITUTED SOLUTION: Performed by: PHYSICIAN ASSISTANT

## 2019-05-01 PROCEDURE — 99223 1ST HOSP IP/OBS HIGH 75: CPT | Performed by: INTERNAL MEDICINE

## 2019-05-01 PROCEDURE — 83605 ASSAY OF LACTIC ACID: CPT | Performed by: PHYSICIAN ASSISTANT

## 2019-05-01 PROCEDURE — 80053 COMPREHEN METABOLIC PANEL: CPT | Performed by: PHYSICIAN ASSISTANT

## 2019-05-01 PROCEDURE — 84145 PROCALCITONIN (PCT): CPT | Performed by: INTERNAL MEDICINE

## 2019-05-01 PROCEDURE — 87040 BLOOD CULTURE FOR BACTERIA: CPT | Performed by: PHYSICIAN ASSISTANT

## 2019-05-01 PROCEDURE — 71045 X-RAY EXAM CHEST 1 VIEW: CPT

## 2019-05-01 PROCEDURE — 85025 COMPLETE CBC W/AUTO DIFF WBC: CPT | Performed by: PHYSICIAN ASSISTANT

## 2019-05-01 PROCEDURE — 94640 AIRWAY INHALATION TREATMENT: CPT

## 2019-05-01 PROCEDURE — 99285 EMERGENCY DEPT VISIT HI MDM: CPT

## 2019-05-01 PROCEDURE — 25010000002 CEFTRIAXONE PER 250 MG: Performed by: PHYSICIAN ASSISTANT

## 2019-05-01 RX ORDER — ASPIRIN 81 MG/1
81 TABLET ORAL DAILY
COMMUNITY

## 2019-05-01 RX ORDER — CEFTRIAXONE SODIUM 1 G/50ML
1 INJECTION, SOLUTION INTRAVENOUS ONCE
Status: COMPLETED | OUTPATIENT
Start: 2019-05-01 | End: 2019-05-01

## 2019-05-01 RX ORDER — NICOTINE 21 MG/24HR
1 PATCH, TRANSDERMAL 24 HOURS TRANSDERMAL
Status: DISCONTINUED | OUTPATIENT
Start: 2019-05-02 | End: 2019-05-03 | Stop reason: HOSPADM

## 2019-05-01 RX ORDER — IPRATROPIUM BROMIDE AND ALBUTEROL SULFATE 2.5; .5 MG/3ML; MG/3ML
3 SOLUTION RESPIRATORY (INHALATION) ONCE
Status: COMPLETED | OUTPATIENT
Start: 2019-05-01 | End: 2019-05-01

## 2019-05-01 RX ORDER — ATORVASTATIN CALCIUM 20 MG/1
20 TABLET, FILM COATED ORAL DAILY
COMMUNITY

## 2019-05-01 RX ORDER — NICOTINE 21 MG/24HR
1 PATCH, TRANSDERMAL 24 HOURS TRANSDERMAL ONCE
Status: DISCONTINUED | OUTPATIENT
Start: 2019-05-01 | End: 2019-05-03 | Stop reason: HOSPADM

## 2019-05-01 RX ADMIN — VANCOMYCIN HYDROCHLORIDE 1500 MG: 10 INJECTION, POWDER, LYOPHILIZED, FOR SOLUTION INTRAVENOUS at 23:06

## 2019-05-01 RX ADMIN — IPRATROPIUM BROMIDE AND ALBUTEROL SULFATE 3 ML: 2.5; .5 SOLUTION RESPIRATORY (INHALATION) at 22:46

## 2019-05-01 RX ADMIN — CEFTRIAXONE SODIUM 1 G: 1 INJECTION, SOLUTION INTRAVENOUS at 21:43

## 2019-05-02 PROBLEM — E87.1 HYPONATREMIA: Status: ACTIVE | Noted: 2019-05-02

## 2019-05-02 LAB
ANION GAP SERPL CALCULATED.3IONS-SCNC: 13 MMOL/L
BASOPHILS # BLD AUTO: 0.01 10*3/MM3 (ref 0–0.2)
BASOPHILS NFR BLD AUTO: 0.1 % (ref 0–1.5)
BUN BLD-MCNC: 16 MG/DL (ref 6–20)
BUN/CREAT SERPL: 20.8 (ref 7–25)
CALCIUM SPEC-SCNC: 8.3 MG/DL (ref 8.6–10.5)
CHLORIDE SERPL-SCNC: 99 MMOL/L (ref 98–107)
CO2 SERPL-SCNC: 21 MMOL/L (ref 22–29)
CREAT BLD-MCNC: 0.77 MG/DL (ref 0.57–1)
D-LACTATE SERPL-SCNC: 1.4 MMOL/L (ref 0.5–2)
DEPRECATED RDW RBC AUTO: 48 FL (ref 37–54)
EOSINOPHIL # BLD AUTO: 0.08 10*3/MM3 (ref 0–0.4)
EOSINOPHIL NFR BLD AUTO: 0.7 % (ref 0.3–6.2)
ERYTHROCYTE [DISTWIDTH] IN BLOOD BY AUTOMATED COUNT: 15.1 % (ref 12.3–15.4)
GFR SERPL CREATININE-BSD FRML MDRD: 77 ML/MIN/1.73
GLUCOSE BLD-MCNC: 176 MG/DL (ref 65–99)
GLUCOSE BLDC GLUCOMTR-MCNC: 123 MG/DL (ref 70–130)
GLUCOSE BLDC GLUCOMTR-MCNC: 142 MG/DL (ref 70–130)
GLUCOSE BLDC GLUCOMTR-MCNC: 147 MG/DL (ref 70–130)
GLUCOSE BLDC GLUCOMTR-MCNC: 151 MG/DL (ref 70–130)
HCT VFR BLD AUTO: 45.2 % (ref 34–46.6)
HGB BLD-MCNC: 13.9 G/DL (ref 12–15.9)
IMM GRANULOCYTES # BLD AUTO: 0.03 10*3/MM3 (ref 0–0.05)
IMM GRANULOCYTES NFR BLD AUTO: 0.3 % (ref 0–0.5)
LYMPHOCYTES # BLD AUTO: 0.8 10*3/MM3 (ref 0.7–3.1)
LYMPHOCYTES NFR BLD AUTO: 7.3 % (ref 19.6–45.3)
MCH RBC QN AUTO: 26.6 PG (ref 26.6–33)
MCHC RBC AUTO-ENTMCNC: 30.8 G/DL (ref 31.5–35.7)
MCV RBC AUTO: 86.4 FL (ref 79–97)
MONOCYTES # BLD AUTO: 0.93 10*3/MM3 (ref 0.1–0.9)
MONOCYTES NFR BLD AUTO: 8.5 % (ref 5–12)
NEUTROPHILS # BLD AUTO: 9.12 10*3/MM3 (ref 1.7–7)
NEUTROPHILS NFR BLD AUTO: 83.4 % (ref 42.7–76)
PLATELET # BLD AUTO: 201 10*3/MM3 (ref 140–450)
PMV BLD AUTO: 9.6 FL (ref 6–12)
POTASSIUM BLD-SCNC: 4 MMOL/L (ref 3.5–5.2)
PROCALCITONIN SERPL-MCNC: 0.16 NG/ML (ref 0.1–0.25)
RBC # BLD AUTO: 5.23 10*6/MM3 (ref 3.77–5.28)
SODIUM BLD-SCNC: 133 MMOL/L (ref 136–145)
WBC NRBC COR # BLD: 10.94 10*3/MM3 (ref 3.4–10.8)

## 2019-05-02 PROCEDURE — 94799 UNLISTED PULMONARY SVC/PX: CPT

## 2019-05-02 PROCEDURE — 25010000002 HEPARIN (PORCINE) PER 1000 UNITS: Performed by: INTERNAL MEDICINE

## 2019-05-02 PROCEDURE — 82962 GLUCOSE BLOOD TEST: CPT

## 2019-05-02 PROCEDURE — 25010000002 PIPERACILLIN SOD-TAZOBACTAM PER 1 G: Performed by: INTERNAL MEDICINE

## 2019-05-02 PROCEDURE — 25010000002 VANCOMYCIN 10 G RECONSTITUTED SOLUTION

## 2019-05-02 PROCEDURE — 99232 SBSQ HOSP IP/OBS MODERATE 35: CPT | Performed by: INTERNAL MEDICINE

## 2019-05-02 PROCEDURE — 85025 COMPLETE CBC W/AUTO DIFF WBC: CPT | Performed by: INTERNAL MEDICINE

## 2019-05-02 PROCEDURE — 80048 BASIC METABOLIC PNL TOTAL CA: CPT | Performed by: INTERNAL MEDICINE

## 2019-05-02 PROCEDURE — 83605 ASSAY OF LACTIC ACID: CPT | Performed by: INTERNAL MEDICINE

## 2019-05-02 PROCEDURE — 94640 AIRWAY INHALATION TREATMENT: CPT

## 2019-05-02 RX ORDER — SODIUM CHLORIDE 0.9 % (FLUSH) 0.9 %
3 SYRINGE (ML) INJECTION EVERY 12 HOURS SCHEDULED
Status: DISCONTINUED | OUTPATIENT
Start: 2019-05-02 | End: 2019-05-03 | Stop reason: HOSPADM

## 2019-05-02 RX ORDER — SODIUM CHLORIDE 0.9 % (FLUSH) 0.9 %
3-10 SYRINGE (ML) INJECTION AS NEEDED
Status: DISCONTINUED | OUTPATIENT
Start: 2019-05-02 | End: 2019-05-03 | Stop reason: HOSPADM

## 2019-05-02 RX ORDER — BUDESONIDE AND FORMOTEROL FUMARATE DIHYDRATE 160; 4.5 UG/1; UG/1
2 AEROSOL RESPIRATORY (INHALATION)
Status: DISCONTINUED | OUTPATIENT
Start: 2019-05-02 | End: 2019-05-03 | Stop reason: HOSPADM

## 2019-05-02 RX ORDER — LISINOPRIL 10 MG/1
10 TABLET ORAL DAILY
Status: DISCONTINUED | OUTPATIENT
Start: 2019-05-02 | End: 2019-05-03 | Stop reason: HOSPADM

## 2019-05-02 RX ORDER — IBUPROFEN 600 MG/1
600 TABLET ORAL EVERY 6 HOURS PRN
Status: DISCONTINUED | OUTPATIENT
Start: 2019-05-02 | End: 2019-05-03 | Stop reason: HOSPADM

## 2019-05-02 RX ORDER — ATORVASTATIN CALCIUM 20 MG/1
20 TABLET, FILM COATED ORAL DAILY
Status: DISCONTINUED | OUTPATIENT
Start: 2019-05-02 | End: 2019-05-03 | Stop reason: HOSPADM

## 2019-05-02 RX ORDER — SODIUM CHLORIDE 9 MG/ML
100 INJECTION, SOLUTION INTRAVENOUS CONTINUOUS
Status: DISCONTINUED | OUTPATIENT
Start: 2019-05-02 | End: 2019-05-03 | Stop reason: HOSPADM

## 2019-05-02 RX ORDER — ASPIRIN 81 MG/1
81 TABLET ORAL DAILY
Status: DISCONTINUED | OUTPATIENT
Start: 2019-05-02 | End: 2019-05-03 | Stop reason: HOSPADM

## 2019-05-02 RX ORDER — DEXTROSE MONOHYDRATE 25 G/50ML
25 INJECTION, SOLUTION INTRAVENOUS
Status: DISCONTINUED | OUTPATIENT
Start: 2019-05-02 | End: 2019-05-03 | Stop reason: HOSPADM

## 2019-05-02 RX ORDER — IPRATROPIUM BROMIDE AND ALBUTEROL SULFATE 2.5; .5 MG/3ML; MG/3ML
3 SOLUTION RESPIRATORY (INHALATION) EVERY 4 HOURS PRN
Status: DISCONTINUED | OUTPATIENT
Start: 2019-05-02 | End: 2019-05-03 | Stop reason: HOSPADM

## 2019-05-02 RX ORDER — HEPARIN SODIUM 5000 [USP'U]/ML
5000 INJECTION, SOLUTION INTRAVENOUS; SUBCUTANEOUS EVERY 8 HOURS SCHEDULED
Status: DISCONTINUED | OUTPATIENT
Start: 2019-05-02 | End: 2019-05-03 | Stop reason: HOSPADM

## 2019-05-02 RX ORDER — NICOTINE POLACRILEX 4 MG
15 LOZENGE BUCCAL
Status: DISCONTINUED | OUTPATIENT
Start: 2019-05-02 | End: 2019-05-03 | Stop reason: HOSPADM

## 2019-05-02 RX ADMIN — HEPARIN SODIUM 5000 UNITS: 5000 INJECTION INTRAVENOUS; SUBCUTANEOUS at 21:44

## 2019-05-02 RX ADMIN — IPRATROPIUM BROMIDE 0.5 MG: 0.5 SOLUTION RESPIRATORY (INHALATION) at 07:57

## 2019-05-02 RX ADMIN — IPRATROPIUM BROMIDE 0.5 MG: 0.5 SOLUTION RESPIRATORY (INHALATION) at 19:25

## 2019-05-02 RX ADMIN — HEPARIN SODIUM 5000 UNITS: 5000 INJECTION INTRAVENOUS; SUBCUTANEOUS at 13:16

## 2019-05-02 RX ADMIN — SODIUM CHLORIDE, PRESERVATIVE FREE 3 ML: 5 INJECTION INTRAVENOUS at 09:10

## 2019-05-02 RX ADMIN — NICOTINE 1 PATCH: 21 PATCH, EXTENDED RELEASE TRANSDERMAL at 09:09

## 2019-05-02 RX ADMIN — ASPIRIN 81 MG: 81 TABLET, COATED ORAL at 09:09

## 2019-05-02 RX ADMIN — TAZOBACTAM SODIUM AND PIPERACILLIN SODIUM 3.38 G: 375; 3 INJECTION, SOLUTION INTRAVENOUS at 21:44

## 2019-05-02 RX ADMIN — SODIUM CHLORIDE 100 ML/HR: 9 INJECTION, SOLUTION INTRAVENOUS at 18:53

## 2019-05-02 RX ADMIN — ATORVASTATIN CALCIUM 20 MG: 20 TABLET, FILM COATED ORAL at 09:09

## 2019-05-02 RX ADMIN — VANCOMYCIN HYDROCHLORIDE 1250 MG: 10 INJECTION, POWDER, LYOPHILIZED, FOR SOLUTION INTRAVENOUS at 11:50

## 2019-05-02 RX ADMIN — HEPARIN SODIUM 5000 UNITS: 5000 INJECTION INTRAVENOUS; SUBCUTANEOUS at 05:48

## 2019-05-02 RX ADMIN — BUDESONIDE AND FORMOTEROL FUMARATE DIHYDRATE 2 PUFF: 160; 4.5 AEROSOL RESPIRATORY (INHALATION) at 07:58

## 2019-05-02 RX ADMIN — BUDESONIDE AND FORMOTEROL FUMARATE DIHYDRATE 2 PUFF: 160; 4.5 AEROSOL RESPIRATORY (INHALATION) at 19:26

## 2019-05-02 RX ADMIN — IBUPROFEN 600 MG: 600 TABLET ORAL at 05:48

## 2019-05-02 RX ADMIN — TAZOBACTAM SODIUM AND PIPERACILLIN SODIUM 3.38 G: 375; 3 INJECTION, SOLUTION INTRAVENOUS at 04:55

## 2019-05-02 RX ADMIN — SODIUM CHLORIDE 100 ML/HR: 9 INJECTION, SOLUTION INTRAVENOUS at 05:30

## 2019-05-02 RX ADMIN — TAZOBACTAM SODIUM AND PIPERACILLIN SODIUM 3.38 G: 375; 3 INJECTION, SOLUTION INTRAVENOUS at 14:09

## 2019-05-02 RX ADMIN — POLYETHYLENE GLYCOL 3350 17 G: 17 POWDER, FOR SOLUTION ORAL at 12:16

## 2019-05-02 RX ADMIN — VANCOMYCIN HYDROCHLORIDE 1250 MG: 10 INJECTION, POWDER, LYOPHILIZED, FOR SOLUTION INTRAVENOUS at 23:58

## 2019-05-02 RX ADMIN — IPRATROPIUM BROMIDE 0.5 MG: 0.5 SOLUTION RESPIRATORY (INHALATION) at 16:16

## 2019-05-02 NOTE — H&P
[facility] Medicine Services  HISTORY AND PHYSICAL    Patient Name: Gladys Iglesias  : 1960  MRN: [id number]  Primary Care Physician: Justina Vines MD  Date of admission: 2019      Subjective   Subjective     Chief Complaint:  rle pain    HPI:  Gladys Iglesias is a 58 y.o. female with hx of aortobifemoral bypass surgery   at [facility] who then developed subsequent incision site infection w/ inpt iv abx/revison of bypass for 6 wks at .  Has had recurrent cellulitis since that time in RLE w/ most recent even in .  Currently pt c/o same rle pain and swelling as well as f/c w/ temps 101.7 pta.  Pt states current sx have been present for 2 days; states pain worse w/ this episode than w/ prior.      Pt has received vanc and rocephin in er.      Review of Systems    Otherwise complete ROS reviewed and is negative except as mentioned in the HPI.    Personal History     Past Medical History:   Diagnosis Date   • Cancer (CMS/HCC)     skin   • COPD (chronic obstructive pulmonary disease) (CMS/HCC)    • Coronary artery disease    • Diabetes mellitus (CMS/HCC)    • Hyperlipidemia    • Hypertension        Past Surgical History:   Procedure Laterality Date   • APPENDECTOMY     • CARDIAC SURGERY     • CHOLECYSTECTOMY     • COLONOSCOPY     • EYE SURGERY     • HYSTERECTOMY     • SKIN BIOPSY     • TUBAL ABDOMINAL LIGATION     • VASCULAR SURGERY         Family History: family history is not on file. Otherwise pertinent FHx was reviewed and unremarkable.     Social History:  reports that she has been smoking cigarettes.  She does not have any smokeless tobacco history on file. She reports that she uses drugs. Drug: Marijuana. She reports that she does not drink alcohol.  Social History     Social History Narrative   • Not on file       Medications:    Available home medication information reviewed.    (Not in a hospital admission)    Allergies   Allergen Reactions   •  Propoxyphene-Acetaminophen Nausea And Vomiting       Objective   Objective     Vital Signs:   Temp:  [98.7 °F (37.1 °C)-99.4 °F (37.4 °C)] 98.7 °F (37.1 °C)  Heart Rate:  [101-141] 103  Resp:  [18] 18  BP: (116-135)/(65-82) 116/67        Physical Exam    gen; alert, oriented, nad  Heent; perrla, eomi, mmm  Cv; rr w/ tachycardia, no mrg  L; end exp wheeze t/o  Abd; soft, +bs, mild diffuse ttp t/o; no r/g  Ext; rle w/ erythema half way up leg ant/post including foot w/ diminished pulses   Skin; see above  Neuro; grossly intact  Psych; mood and affect appropriate    Results Reviewed:  I have personally reviewed current lab, radiology, and data and agree.    Results from last 7 days   Lab Units 05/01/19 2117   WBC 10*3/mm3 17.02*   HEMOGLOBIN g/dL 14.3   HEMATOCRIT % 45.9   PLATELETS 10*3/mm3 237     Results from last 7 days   Lab Units 05/01/19 2117   SODIUM mmol/L 132*   POTASSIUM mmol/L 4.1   CHLORIDE mmol/L 95*   CO2 mmol/L 25.0   BUN mg/dL 17   CREATININE mg/dL 0.78   GLUCOSE mg/dL 111*   CALCIUM mg/dL 8.6   ALT (SGPT) U/L 9   AST (SGOT) U/L 12     Estimated Creatinine Clearance: 75.6 mL/min (by C-G formula based on SCr of 0.78 mg/dL).  Brief Urine Lab Results     None        No results found for: BNP  Imaging Results (last 24 hours)     Procedure Component Value Units Date/Time    XR Chest 1 View [[id number]] Collected:  05/01/19 2116     Updated:  05/01/19 2119    Narrative:       CR Chest 1 Vw    INDICATION:   Penicillin resistance staph infection in the right lower leg for the past couple of days. COPD. Cough and wheezing with fever for years.     COMPARISON:    8/17/2018    FINDINGS:  Single portable AP view of the chest.  Cardiac silhouette is borderline enlarged and stable. Elongation of the thoracic aorta. Underlying COPD. Vascularity is within normal limits. Lungs appear clear with exception of some probable linear atelectasis or  scarring in the left base. No pneumothorax.      Impression:       Chronic  appearing lung changes. No definite superimposed active disease.    Signer Name: Mannie Ojeda MD   Signed: 5/1/2019 9:16 PM   Workstation Name: [id number]                Assessment/Plan   Assessment / Plan     Active Hospital Problems    Diagnosis POA   • Cellulitis [L03.90] Yes   • Sepsis (CMS/[facility]) [A41.9] Yes   • Smoker [F17.200] Yes   • Hypertension [I10] Yes   • Hyperlipidemia [E78.5] Yes   • Diabetes mellitus (CMS/[facility]) [E11.9] Yes   • Coronary artery disease [I25.10] Yes   • COPD (chronic obstructive pulmonary disease) (CMS/[facility]) [J44.9] Yes         57 y/o female w/ hx of bilateral aortofemoral bypass and hx of recurrent RLE cellulitis here again w/  1. RLE cellulitis/sepsis; cultures, vanc/rocephin.  Fluids.  PT REQUESTS NO NARCOTICS AS SHE HAS CONCERNS THAT SHE MAY BECOME ADDICTED.  Ibuprofen for pain.  May need ID given hx.  2. Tobacco abuse; jonnathan patch offered and educated on importance of quitting.  3. Oxygen dependent copd; cont home meds/nebs  4. DM; ssi      DVT prophylaxis:  heparin    CODE STATUS:    There are no questions and answers to display.       Admission Status:  I believe this patient meets  INPATIENT status due to the need for care which can only be reasonably provided in an hospital setting such as possible need for aggressive/expedited ancillary services and/or consultation services, IV medications, close physician monitoring, and/or procedures.  In such, I feel patient’s risk for adverse outcomes and need for care warrant INPATIENT evaluation and predict the patient’s care encounter to likely last beyond 2 midnights.      Electronically signed by Jazmine Love MD, 05/01/19, 10:26 PM.

## 2019-05-02 NOTE — PLAN OF CARE
Problem: Infection, Risk/Actual (Adult)  Goal: Identify Related Risk Factors and Signs and Symptoms  Outcome: Ongoing (interventions implemented as appropriate)   05/02/19 1629   Infection, Risk/Actual (Adult)   Related Risk Factors (Infection, Risk/Actual) skin integrity impairment;treatment plan   Signs and Symptoms (Infection, Risk/Actual) edema     Goal: Infection Prevention/Resolution  Outcome: Ongoing (interventions implemented as appropriate)   05/02/19 1629   Infection, Risk/Actual (Adult)   Infection Prevention/Resolution making progress toward outcome       Problem: Skin and Soft Tissue Infection (Adult)  Goal: Signs and Symptoms of Listed Potential Problems Will be Absent, Minimized or Managed (Skin and Soft Tissue Infection)  Outcome: Ongoing (interventions implemented as appropriate)   05/02/19 1629   Goal/Outcome Evaluation   Problems Assessed (Skin and Soft Tissue Infection) all   Problems Present (Skin/Soft Tissue Inf) situational response

## 2019-05-02 NOTE — ED PROVIDER NOTES
Subjective   Ms. Gladys Iglesias is a 58-year-old female presenting to the emergency department with complaints of right lower extremity pain and swelling. The patient reports that she has a history of aortobifemoral bypass surgery in April 2016 at [facility]. Shortly after the surgery, she developed an abscess near the incision site in the right groin that was draining and infected. She was admitted at  for six weeks following the surgery and received IV Rocephin. Since that time, she has had intermittent flare ups of cellulitis throughout the right lower extremity, typically in the distal portion. She was most recently admitted in August of 2018 to receive IV antibiotics. She reports that in this episode, she has had gradually worsening pain, redness, and swelling from the right foot spreading up to just below the knee. There is no break in the skin, drainage, or weeping. She endorses accompanying fevers up to 101.7 degrees and chills. Of note, Ms. Iglesias is a current everyday smoker and is on oxygen at all times. Her past medical history is remarkable for COPD, coronary artery disease, hypertension, hyperlipidemia, MRSA, and diabetes mellitus. There are no additional acute complaints at this time.        History provided by:  Patient  Extremity Pain   Location:  Distal right lower extremity  Quality:  Painful, redness, swollen  Severity:  Moderate  Onset quality:  Gradual  Duration:  2 days  Timing:  Constant  Progression:  Worsening  Chronicity:  Recurrent  Context:  Hx aortobifemoral bypass surgery, multiple flare ups of cellulitis since  Relieved by:  None tried  Worsened by:  Nothing  Ineffective treatments:  None tried  Associated symptoms: fever        Review of Systems   Constitutional: Positive for chills and fever.   HENT: Negative.    Respiratory: Negative.    Cardiovascular: Positive for leg swelling.   Gastrointestinal: Negative.    Genitourinary: Negative.    Musculoskeletal: Negative.    Skin: Positive  for color change.        Erythema to RLE with history of MRSA.   Neurological: Negative.    Psychiatric/Behavioral: Negative.    All other systems reviewed and are negative.      Past Medical History:   Diagnosis Date   • Cancer (CMS/HCC)     skin   • COPD (chronic obstructive pulmonary disease) (CMS/HCC)    • Coronary artery disease    • Diabetes mellitus (CMS/HCC)    • Hyperlipidemia    • Hypertension        Allergies   Allergen Reactions   • Propoxyphene-Acetaminophen Nausea And Vomiting       Past Surgical History:   Procedure Laterality Date   • APPENDECTOMY     • CARDIAC SURGERY     • CHOLECYSTECTOMY     • COLONOSCOPY     • EYE SURGERY     • HYSTERECTOMY     • SKIN BIOPSY     • TUBAL ABDOMINAL LIGATION     • VASCULAR SURGERY         History reviewed. No pertinent family history.    Social History     Socioeconomic History   • Marital status: Single     Spouse name: Not on file   • Number of children: Not on file   • Years of education: Not on file   • Highest education level: Not on file   Tobacco Use   • Smoking status: Current Every Day Smoker     Types: Cigarettes   Substance and Sexual Activity   • Alcohol use: No   • Drug use: Yes     Types: Marijuana         Objective   Physical Exam   Constitutional: She is oriented to person, place, and time. She appears well-developed and well-nourished. No distress.   HENT:   Head: Normocephalic and atraumatic.   Nose: Nose normal.   Eyes: Conjunctivae are normal. No scleral icterus.   Neck: Normal range of motion. Neck supple.   Cardiovascular: Regular rhythm and normal heart sounds. Tachycardia present.   No murmur heard.  Strong bilateral DP and PT pulses.   Pulmonary/Chest: Effort normal and breath sounds normal. No respiratory distress.   Bilateral wheezes with inspiration and expiration. Oxygen in place per nasal cannula.   Abdominal: Soft. Bowel sounds are normal. There is no tenderness.   Musculoskeletal: Normal range of motion.   Trace edema to the right  lower extremity, see skin exam for more details.   Neurological: She is alert and oriented to person, place, and time.   Skin: Skin is warm and dry.   Bright, confluent erythema to the right lower extremity that extends just below the knee with warmth. No central abscess or break in the skin. Exam is consistent with cellulitis.   Psychiatric: She has a normal mood and affect. Her behavior is normal.   Nursing note and vitals reviewed.      Procedures         ED Course  ED Course as of May 01 2237   Wed May 01, 2019   2151 CBC reveals leukocytosis with white blood cell count of 17,000.  Differential shows 86% neutrophils.  Chemistries are within normal limits.  Lactic acid is normal at 0.7.  Blood cultures x2 sets are in process.  Chest x-ray reveals no evidence of pneumonia, only chronic changes.  History and exam is consistent with right lower extremity cellulitis.  Temp at home was 101.  We initiated Rocephin and vancomycin.  I will page the hospitalist to discuss admission.  [FC]   2223 Discussed the case with Dr. Osborne.  She is agreeable with admission to telemetry.  Vital signs are stable.  HR initially 141 and it is improved 103.  We will give duo neb tx prior to admission.  [FC]      ED Course User Index  [FC] Lizy Doyle PA-C                     [facility]    Final diagnoses:   Cellulitis of right leg   History of MRSA infection   Peripheral vascular disease (CMS/HCC)   Chronic obstructive pulmonary disease, unspecified COPD type (CMS/HCC)   Tobacco dependence   Type 2 diabetes mellitus without complication, without long-term current use of insulin (CMS/[facility])   Tachycardia       Documentation assistance provided by anand Elam.  Information recorded by the scribe was done at my direction and has been verified and validated by me.     Salvador Elam  05/01/19 2116       Lizy Doyle PA-C  05/01/19 2237

## 2019-05-02 NOTE — PLAN OF CARE
Problem: Infection, Risk/Actual (Adult)  Goal: Identify Related Risk Factors and Signs and Symptoms  Outcome: Ongoing (interventions implemented as appropriate)   05/02/19 0500   Infection, Risk/Actual (Adult)   Related Risk Factors (Infection, Risk/Actual) chronic illness/condition;skin integrity impairment   Signs and Symptoms (Infection, Risk/Actual) blood glucose changes;malaise;pain;weakness       Problem: Skin and Soft Tissue Infection (Adult)  Goal: Signs and Symptoms of Listed Potential Problems Will be Absent, Minimized or Managed (Skin and Soft Tissue Infection)  Outcome: Ongoing (interventions implemented as appropriate)   05/02/19 0500   Goal/Outcome Evaluation   Problems Assessed (Skin and Soft Tissue Infection) all   Problems Present (Skin/Soft Tissue Inf) infection progression;situational response

## 2019-05-02 NOTE — PROGRESS NOTES
[facility] Medicine Services  PROGRESS NOTE    Patient Name: Gladys Iglesias  : 1960  MRN: [id number]    Date of Admission: 2019  Length of Stay: 1  Primary Care Physician: Justina Vines MD    Subjective   Subjective     CC:    cellulitis    HPI:  Right lower leg still somewhat red and a little painful when walking. No weeping. No fever overnight, but states she had an elevated temp at home.    Review of Systems  Gen- No fevers, chills  CV- No chest pain, palpitations  Resp- No cough, dyspnea  GI- No N/V/D, abd pain        Otherwise ROS is negative except as mentioned in the HPI.    Objective   Objective     Vital Signs:   Temp:  [98 °F (36.7 °C)-99.4 °F (37.4 °C)] 98 °F (36.7 °C)  Heart Rate:  [] 100  Resp:  [16-18] 16  BP: ()/(52-98) 96/52        Physical Exam:  Constitutional -no acute distress, non toxic, in bed  HEENT-NCAT, mucous membranes moist  CV-RRR, S1 S2 normal, no m/r/g  Resp-CTAB, no wheezes, rhonchi or rales  Abd-soft, non-tender, non-distended, normo active bowel sounds, overweight  Ext-right lower leg with trace edema  Neuro-alert and oriented, speech clear, moves all extremities   Psych-normal affect   Skin- erythema right calf and extending distally over right lower leg.    Results Reviewed:  I have personally reviewed current lab, radiology, and data and agree.    Results from last 7 days   Lab Units 19  04419   WBC 10*3/mm3 10.94* 17.02*   HEMOGLOBIN g/dL 13.9 14.3   HEMATOCRIT % 45.2 45.9   PLATELETS 10*3/mm3 201 237     Results from last 7 days   Lab Units 19  0447 19   SODIUM mmol/L 133* 132*   POTASSIUM mmol/L 4.0 4.1   CHLORIDE mmol/L 99 95*   CO2 mmol/L 21.0* 25.0   BUN mg/dL 16 17   CREATININE mg/dL 0.77 0.78   GLUCOSE mg/dL 176* 111*   CALCIUM mg/dL 8.3* 8.6   ALT (SGPT) U/L  --  9   AST (SGOT) U/L  --  12     Estimated Creatinine Clearance: 76.9 mL/min (by C-G formula based on SCr of 0.77  mg/dL).    No results found for: BNP    Microbiology Results Abnormal     None          Imaging Results (last 24 hours)     Procedure Component Value Units Date/Time    XR Chest 1 View [[id number]] Collected:  05/01/19 2116     Updated:  05/01/19 2119    Narrative:       CR Chest 1 Vw    INDICATION:   Penicillin resistance staph infection in the right lower leg for the past couple of days. COPD. Cough and wheezing with fever for years.     COMPARISON:    8/17/2018    FINDINGS:  Single portable AP view of the chest.  Cardiac silhouette is borderline enlarged and stable. Elongation of the thoracic aorta. Underlying COPD. Vascularity is within normal limits. Lungs appear clear with exception of some probable linear atelectasis or  scarring in the left base. No pneumothorax.      Impression:       Chronic appearing lung changes. No definite superimposed active disease.    Signer Name: Mannie Ojeda MD   Signed: 5/1/2019 9:16 PM   Workstation Name: [id number]                  I have reviewed the medications:    Current Facility-Administered Medications:   •  [START ON 5/3/2019] ! Vancomycin trough 5/3 at 1130.  Please hold vancomycin dose 5/3 at noon until pharmacy can evaluate level, , Does not apply, Once, Vern Moscoso, [facility]  •  aspirin EC tablet 81 mg, 81 mg, Oral, Daily, Day, Jazmine CHRISTIANSON MD  •  atorvastatin (LIPITOR) tablet 20 mg, 20 mg, Oral, Daily, Kate, Jazmine CHRISTIANSON MD  •  budesonide-formoterol (SYMBICORT) 160-4.5 MCG/ACT inhaler 2 puff, 2 puff, Inhalation, BID - RT, Jazmine Love MD, 2 puff at 05/02/19 0750  •  dextrose (D50W) 25 g/ 50mL Intravenous Solution 25 g, 25 g, Intravenous, Q15 Min PRN, Jazmine Love MD  •  dextrose (GLUTOSE) oral gel 15 g, 15 g, Oral, Q15 Min PRN, Jazmine Love MD  •  glucagon (human recombinant) (GLUCAGEN DIAGNOSTIC) injection 1 mg, 1 mg, Subcutaneous, PRN, Kate, Jazmine CHRISTIANSON MD  •  heparin (porcine) 5000 UNIT/ML injection 5,000 Units, 5,000 Units, Subcutaneous, Q8H, Jazmine Love MD,  5,000 Units at 05/02/19 0548  •  ibuprofen (ADVIL,MOTRIN) tablet 600 mg, 600 mg, Oral, Q6H PRN, Day, Jazmine CHRISTIANSON MD, 600 mg at 05/02/19 0548  •  insulin lispro (humaLOG) injection 0-7 Units, 0-7 Units, Subcutaneous, 4x Daily With Meals & Nightly, Kate, Jazmine CHRISTIANSON MD  •  ipratropium (ATROVENT) nebulizer solution 0.5 mg, 0.5 mg, Nebulization, 4x Daily - RT, Jazmine Love MD, 0.5 mg at 05/02/19 0757  •  ipratropium-albuterol (DUO-NEB) nebulizer solution 3 mL, 3 mL, Nebulization, Q4H PRN, Jazmine Love MD  •  lisinopril (PRINIVIL,ZESTRIL) tablet 10 mg, 10 mg, Oral, Daily, Kate, Jazmine CHRISTIANSON MD  •  nicotine (NICODERM CQ) 21 MG/24HR patch 1 patch, 1 patch, Transdermal, Q24H, Lizy Doyle PA-C  •  nicotine (NICODERM CQ) 21 MG/24HR patch 1 patch, 1 patch, Transdermal, Once, Lizy Doyle PA-C, Stopped at 05/02/19 0549  •  [DISCONTINUED] vancomycin 1500 mg/500 mL 0.9% NS IVPB ([facility]), 20 mg/kg, Intravenous, Once **AND** Pharmacy to dose vancomycin, , Does not apply, Continuous PRN, Jazmine Love MD  •  piperacillin-tazobactam (ZOSYN) 3.375 g in iso-osmotic dextrose 50 ml (premix), 3.375 g, Intravenous, Q8H, Jazmine Love MD  •  sodium chloride 0.9 % flush 3 mL, 3 mL, Intravenous, Q12H, Jazmine Love MD  •  sodium chloride 0.9 % flush 3-10 mL, 3-10 mL, Intravenous, PRN, Jazmine Love MD  •  sodium chloride 0.9 % infusion, 100 mL/hr, Intravenous, Continuous, Jazmine Love MD, Last Rate: 100 mL/hr at 05/02/19 0530, 100 mL/hr at 05/02/19 0530  •  vancomycin 1250 mg/250 mL 0.9% NS IVPB ([facility]), 15 mg/kg, Intravenous, Q12H, Vern Moscoso, [facility]      [START ON 5/3/2019] Pharmacy Consult  Does not apply Once   aspirin 81 mg Oral Daily   atorvastatin 20 mg Oral Daily   budesonide-formoterol 2 puff Inhalation BID - RT   heparin (porcine) 5,000 Units Subcutaneous Q8H   insulin lispro 0-7 Units Subcutaneous 4x Daily With Meals & Nightly   ipratropium 0.5 mg Nebulization 4x Daily - RT   lisinopril 10 mg Oral Daily   nicotine 1 patch  Transdermal Q24H   nicotine 1 patch Transdermal Once   piperacillin-tazobactam 3.375 g Intravenous Q8H   sodium chloride 3 mL Intravenous Q12H   vancomycin 15 mg/kg Intravenous Q12H       Assessment/Plan   Assessment / Plan     Active Hospital Problems    Diagnosis POA   • Cellulitis [L03.90] Yes   • Sepsis (CMS/[facility]) [A41.9] Yes   • Smoker [F17.200] Yes   • Hypertension [I10] Yes   • Hyperlipidemia [E78.5] Yes   • Diabetes mellitus (CMS/[facility]) [E11.9] Yes   • Coronary artery disease [I25.10] Yes   • COPD (chronic obstructive pulmonary disease) (CMS/[facility]) [J44.9] Yes          Brief Hospital Course to date:  Gladys Iglesias is a 58 y.o. female with hx of aortobifemoral [facility] who then developed subsequent incision site infection w/ inpt iv abx/revison of bypass for 6 wks at .  Has had recurrent cellulitis since that time in RLE w/ most recent even in 8/2018.  Currently pt c/o same rle pain and swelling as well as f/c w/ temps 101.7 pta.  Pt states current sx have been present for 2 days; states pain worse w/ this episode than w/ prior.       Cellulitis  - continue vanc and zosyn  - suspect will need at least 14 days antibiotic therapy given underlying bypass grafts  - will ask ID to evaluate and make recommendations    Leukocytosis  - improved    Hyponatremia  - mild, asymptomatic, monitor    COPD    Tobacco abuse  -  cessation    DMII    DVT Prophylaxis:  heparin    Disposition: I expect the patient to be discharged home in 1-2 days    CODE STATUS:   Code Status and Medical Interventions:   Ordered at: 05/02/19 0009     Code Status:    CPR     Medical Interventions (Level of Support Prior to Arrest):    Full         Electronically signed by Dain Roy MD, 05/02/19, 9:06 AM.

## 2019-05-02 NOTE — PLAN OF CARE
Problem: Patient Care Overview  Goal: Individualization and Mutuality  Outcome: Ongoing (interventions implemented as appropriate)   05/02/19 1631   Individualization   Patient Specific Goals (Include Timeframe) No skin breakdown   Patient Specific Interventions RLE elevated, IV antibiotics     Goal: Interprofessional Rounds/Family Conf  Outcome: Ongoing (interventions implemented as appropriate)   05/02/19 1631   Interdisciplinary Rounds/Family Conf   Participants nursing;patient

## 2019-05-02 NOTE — PLAN OF CARE
Problem: Patient Care Overview  Goal: Plan of Care Review  Outcome: Ongoing (interventions implemented as appropriate)   05/02/19 0502   Coping/Psychosocial   Plan of Care Reviewed With patient   Plan of Care Review   Progress no change   OTHER   Outcome Summary Patient admitted from ED this shift. VSS; UOP adequate. Tolerating IV antibiotics. Will continue to monitor.

## 2019-05-02 NOTE — PLAN OF CARE
Problem: Infection, Risk/Actual (Adult)  Intervention: Manage Suspected/Actual Infection   05/02/19 0158   Safety Interventions   Infection Management aseptic technique maintained;cultures obtained and sent to lab     Intervention: Prevent Infection/Maximize Resistance   05/02/19 0158   Respiratory Interventions   Airway/Ventilation Management airway patency maintained   Nutrition Interventions   Glycemic Management blood glucose monitoring;oral hydration promoted   Oral Nutrition Promotion physical activity promoted;rest periods promoted;safe use of adaptive equipment encouraged   Pain/Comfort/Sleep Interventions   Sleep/Rest Enhancement awakenings minimized;consistent schedule promoted;family presence promoted;noise level reduced;regular sleep/rest pattern promoted

## 2019-05-02 NOTE — PROGRESS NOTES
"Pharmacokinetic Consult - Vancomycin Dosing  Gladys Iglesias is a 58 y.o. female who has been consulted for vancomycin dosing for sepsis  (goal trough 15-20 mcg/mL).  Ht - 154.9 cm (61\")  Wt - 79.4 kg (175 lb)    Current Antimicrobial Therapy  Zosyn 3.375 gm IV q8h - day 1 (started on 5/2)  Vancomycin 1250 mg IV q12h - day 1 (started on 5/2)    Allergies  Propoxyphene-acetaminophen    Microbiology and Radiology  Microbiology Results (last 10 days)       ** No results found for the last 240 hours. **          Relevant clinical data and objective history reviewed:  Results from last 7 days   Lab Units 05/01/19  2117   BUN mg/dL 17   CREATININE mg/dL 0.78    Estimated Creatinine Clearance: 75.9 mL/min (by C-G formula based on SCr of 0.78 mg/dL).   Intake & Output (last 3 days)         04/29 0701 - 04/30 0700 04/30 0701 - 05/01 0700 05/01 0701 - 05/02 0700    IV Piggyback   550    Total Intake(mL/kg)   550 (6.9)    Net   +550                 Asessment/Plan  1. Will give vancomycin 1500 mg IV once (given in ED 5/1 at 2300)                                                followed by      Vancomycin 1250 mg IV q 12 hours    2. Vancomycin trough is scheduled 5/3 at 1130 prior to the 4th dose    Vern Moscoso [facility]  5/2/2019  4:37 AM  "

## 2019-05-02 NOTE — PROGRESS NOTES
Discharge Planning Assessment  [facility]     Patient Name: Gladys Iglesias  MRN: [id number]  Today's Date: 5/2/2019    Admit Date: 5/1/2019    Discharge Needs Assessment     Row Name 05/02/19 1211       Living Environment    Lives With  friend(s)    Name(s) of Who Lives With Patient  FriendNasreen    Current Living Arrangements  home/apartment/condo    Primary Care Provided by  self    Provides Primary Care For  no one    Family Caregiver if Needed  friend(s)    Family Caregiver Names  Friend, Nasreen John    Quality of Family Relationships  supportive;involved;helpful    Living Arrangement Comments  Lives with friends in house with steps she manages       Resource/Environmental Concerns    Resource/Environmental Concerns  none    Transportation Concerns  car, none Home O2 at 2L/NC at nighta and PRN through [employer]       Transition Planning    Patient/Family Anticipates Transition to  home with family    Patient/Family Anticipated Services at Transition  none    Transportation Anticipated  family or friend will provide       Discharge Needs Assessment    Readmission Within the Last 30 Days  no previous admission in last 30 days    Concerns to be Addressed  no discharge needs identified    Equipment Currently Used at Home  nebulizer;oxygen;rollator    Anticipated Changes Related to Illness  none    Equipment Needed After Discharge  none        Discharge Plan     Row Name 05/02/19 1214       Plan    Plan  Plan is home with friends at discharge    Patient/Family in Agreement with Plan  yes    Plan Comments  Met with patient at bedside to initate initial Discharge planning.  Patient lives with friends in house  with steps she uses with occasional difficulty.  No home health.  Has a Nebulizer, Rollator, and Home O2 by [employer].  PCP is Justina Vines.  Following for D/C needs    Final Discharge Disposition Code  01 - home or self-care        Destination      No service coordination in this  encounter.      Durable Medical Equipment      No service coordination in this encounter.      Dialysis/Infusion      No service coordination in this encounter.      Home Medical Care      No service coordination in this encounter.      Therapy      No service coordination in this encounter.      Community Resources      No service coordination in this encounter.          Demographic Summary     Row Name 05/02/19 1211       General Information    Admission Type  inpatient    Arrived From  emergency department    Referral Source  admission list    Reason for Consult  discharge planning    Preferred Language  English        Functional Status     Row Name 05/02/19 1211       Functional Status    Usual Activity Tolerance  moderate    Current Activity Tolerance  moderate       Functional Status, IADL    Medications  independent    Meal Preparation  independent    Housekeeping  independent    Laundry  independent    Shopping  independent        Psychosocial    No documentation.       Abuse/Neglect    No documentation.       Legal    No documentation.       Substance Abuse    No documentation.       Patient Forms    No documentation.           Viviana Cat RN

## 2019-05-02 NOTE — CONSULTS
INFECTIOUS DISEASE CONSULT/INITIAL HOSPITAL VISIT    Gladys Iglesias  1960  [id number]    Date of Consult: 5/2/2019    Admission Date: 5/1/2019      Requesting Provider: No ref. provider found  Evaluating Physician: Juventino Santoyo MD    Reason for Consultation: right leg infection    History of present illness:    Patient is a 58 y.o. female with history of aortobifemoral bypass surgery 4/16 at outside hospital who had a have revision of bypass at [facility].  Patient has been admitted to [facility] in August 2018 was seen by my associate Dr. Ervin Muir at that time patient was treated with oral antibiotics upon discharge.  Patient returns with symptoms of right leg pain swelling and fevers of 101.7 degrees patient was started on broad-spectrum antibiotics including vancomycin and Rocephin.      Denies drainage from groing wound which is completely healed, no open wounds of right leg;  Says that her cellulitis has recurred several times and sometimes this gets better by elevation of leg but will take several weeks to clear up.     Doesn't wear compression stockings.      Past Medical History:   Diagnosis Date   • Cancer (CMS/HCC)     skin   • COPD (chronic obstructive pulmonary disease) (CMS/HCC)    • Coronary artery disease    • Diabetes mellitus (CMS/HCC)    • Hyperlipidemia    • Hypertension        Past Surgical History:   Procedure Laterality Date   • APPENDECTOMY     • CHOLECYSTECTOMY     • COLONOSCOPY     • EYE SURGERY     • HYSTERECTOMY     • SKIN BIOPSY     • TUBAL ABDOMINAL LIGATION     • VASCULAR SURGERY         History reviewed. No pertinent family history.    Social History     Socioeconomic History   • Marital status: Single     Spouse name: Not on file   • Number of children: Not on file   • Years of education: Not on file   • Highest education level: Not on file   Tobacco Use   • Smoking status: Current Every Day Smoker     Packs/day: 0.25     Years: 15.00     Pack years: 3.75      Types: Cigarettes   • Smokeless tobacco: Never Used   Substance and Sexual Activity   • Alcohol use: No   • Drug use: Yes     Types: Marijuana   • Sexual activity: Defer       Allergies   Allergen Reactions   • Propoxyphene-Acetaminophen Nausea And Vomiting         Medication:    Current Facility-Administered Medications:   •  [START ON 5/3/2019] ! Vancomycin trough 5/3 at 1130.  Please hold vancomycin dose 5/3 at noon until pharmacy can evaluate level, , Does not apply, Once, Vern Moscoso, [facility]  •  aspirin EC tablet 81 mg, 81 mg, Oral, Daily, Day, Jazmine CHRISTIANSON MD, 81 mg at 05/02/19 0909  •  atorvastatin (LIPITOR) tablet 20 mg, 20 mg, Oral, Daily, Day, Jazmine CHRISTIANSON MD, 20 mg at 05/02/19 0909  •  budesonide-formoterol (SYMBICORT) 160-4.5 MCG/ACT inhaler 2 puff, 2 puff, Inhalation, BID - RT, Day, Jazmine CHRISTIANSON MD, 2 puff at 05/02/19 0758  •  dextrose (D50W) 25 g/ 50mL Intravenous Solution 25 g, 25 g, Intravenous, Q15 Min PRN, Day, Jazmine CHRISTIANSON MD  •  dextrose (GLUTOSE) oral gel 15 g, 15 g, Oral, Q15 Min PRN, Kate, Jazmine CHRISTIANSON MD  •  glucagon (human recombinant) (GLUCAGEN DIAGNOSTIC) injection 1 mg, 1 mg, Subcutaneous, PRN, Day, Jazmine CHRISTIANSON MD  •  heparin (porcine) 5000 UNIT/ML injection 5,000 Units, 5,000 Units, Subcutaneous, Q8H, Day, Jazmine CHRISTIANSON MD, 5,000 Units at 05/02/19 1316  •  ibuprofen (ADVIL,MOTRIN) tablet 600 mg, 600 mg, Oral, Q6H PRN, Day, Jazmine CHRISTIANSON MD, 600 mg at 05/02/19 0548  •  insulin lispro (humaLOG) injection 0-7 Units, 0-7 Units, Subcutaneous, 4x Daily With Meals & Nightly, Day, Jazmine CHRISTIANSON MD  •  ipratropium (ATROVENT) nebulizer solution 0.5 mg, 0.5 mg, Nebulization, 4x Daily - RT, Day, Jazmine CHRISTIANSON MD, 0.5 mg at 05/02/19 0757  •  ipratropium-albuterol (DUO-NEB) nebulizer solution 3 mL, 3 mL, Nebulization, Q4H PRN, Day, Jazmine CHRISTIANSON MD  •  lisinopril (PRINIVIL,ZESTRIL) tablet 10 mg, 10 mg, Oral, Daily, Day, Jazmine CHRISTIANSON MD  •  nicotine (NICODERM CQ) 21 MG/24HR patch 1 patch, 1 patch, Transdermal, Q24H, Lizy Doyle PA-C,  1 patch at 05/02/19 0909  •  nicotine (NICODERM CQ) 21 MG/24HR patch 1 patch, 1 patch, Transdermal, Once, Lizy Doyle PA-C, Stopped at 05/02/19 0549  •  [DISCONTINUED] vancomycin 1500 mg/500 mL 0.9% NS IVPB ([facility]), 20 mg/kg, Intravenous, Once **AND** Pharmacy to dose vancomycin, , Does not apply, Continuous PRN, Jazmine Love MD  •  piperacillin-tazobactam (ZOSYN) 3.375 g in iso-osmotic dextrose 50 ml (premix), 3.375 g, Intravenous, Q8H, Jazmine Love MD, 3.375 g at 05/02/19 1409  •  polyethylene glycol 3350 powder (packet), 17 g, Oral, Daily PRN, Dain Roy MD, 17 g at 05/02/19 1216  •  sodium chloride 0.9 % flush 3 mL, 3 mL, Intravenous, Q12H, Jazmine Love MD, 3 mL at 05/02/19 0910  •  sodium chloride 0.9 % flush 3-10 mL, 3-10 mL, Intravenous, PRN, Jazmine Love MD  •  sodium chloride 0.9 % infusion, 100 mL/hr, Intravenous, Continuous, Jazmine Love MD, Last Rate: 100 mL/hr at 05/02/19 0530, 100 mL/hr at 05/02/19 0530  •  vancomycin 1250 mg/250 mL 0.9% NS IVPB ([facility]), 15 mg/kg, Intravenous, Q12H, Vern Moscoso, [facility], 1,250 mg at 05/02/19 1150    Antibiotics:  Anti-Infectives (From admission, onward)    Ordered     Dose/Rate Route Frequency Start Stop    05/02/19 0430  piperacillin-tazobactam (ZOSYN) 3.375 g in iso-osmotic dextrose 50 ml (premix)     Ordering Provider:  Jazmine Love MD    3.375 g  over 4 Hours Intravenous Every 8 Hours 05/02/19 1200 05/05/19 1159    05/02/19 0452  vancomycin 1250 mg/250 mL 0.9% NS IVPB ([facility])     Ordering Provider:  Vern Moscoso, [facility]    15 mg/kg × 79.4 kg  over 75 Minutes Intravenous Every 12 Hours 05/02/19 1200 05/04/19 2359    05/02/19 0430  piperacillin-tazobactam (ZOSYN) 3.375 g in iso-osmotic dextrose 50 ml (premix)     Ordering Provider:  Jazmine Love MD    3.375 g  over 30 Minutes Intravenous Once 05/02/19 0530 05/02/19 0525    05/02/19 0430  Pharmacy to dose vancomycin     Ordering Provider:  Jazmine Love MD     Does not apply Continuous PRN  19 0430 19 0429    19  cefTRIAXone (ROCEPHIN) IVPB 1 g     Ordering Provider:  Lizy Doyle PA-C    1 g  100 mL/hr over 30 Minutes Intravenous Once 19 2200    19  vancomycin 1500 mg/500 mL 0.9% NS IVPB ([facility])     Ordering Provider:  Lizy Doyle PA-C    20 mg/kg × 78.9 kg  333.3 mL/hr over 90 Minutes Intravenous Once 19 0045            Review of Systems:  Patient admits to fevers but denies chills chest pain, palpitations, cough, dyspnea, nausea, diarrhea, abdominal pain.    Review of systems were reviewed and were unremarkable  Physical Exam:   Vital Signs  Temp (24hrs), Av.4 °F (36.9 °C), Min:97.7 °F (36.5 °C), Max:99.4 °F (37.4 °C)    Temp  Min: 97.7 °F (36.5 °C)  Max: 99.4 °F (37.4 °C)  BP  Min: 96/52  Max: 135/65  Pulse  Min: 95  Max: 141  Resp  Min: 16  Max: 18  SpO2  Min: 89 %  Max: 98 %    GENERAL: Awake and alert, in no acute distress.   HEENT: Normocephalic, atraumatic.  PERRL. EOMI. No conjunctival injection. No icterus. Oropharynx clear without evidence of thrush or exudate    HEART: RRR; No murmur, rubs, gallops.   LUNGS: Clear to auscultation bilaterally  ABDOMEN: Soft, nontender, nondistended.   EXT: Right leg with trace edema there is redness of the right calf extending distally over the right leg in the lower portion no ulceration of right leg; no open wound    MSK: FROM without joint effusions noted arms/legs.    SKIN: Warm and dry without cutaneous eruptions on Inspection/palpation.    NEURO: Oriented to PPT. No focal deficits on motor/sensory exam at arms/legs.  PSYCHIATRIC: Normal insight and judgement. Cooperative with PE    Laboratory Data    Results from last 7 days   Lab Units 19  0447 19  2117   WBC 10*3/mm3 10.94* 17.02*   HEMOGLOBIN g/dL 13.9 14.3   HEMATOCRIT % 45.2 45.9   PLATELETS 10*3/mm3 201 237     Results from last 7 days   Lab Units 19  0447   SODIUM mmol/L 133*   POTASSIUM mmol/L  4.0   CHLORIDE mmol/L 99   CO2 mmol/L 21.0*   BUN mg/dL 16   CREATININE mg/dL 0.77   GLUCOSE mg/dL 176*   CALCIUM mg/dL 8.3*     Results from last 7 days   Lab Units 05/01/19 2117   ALK PHOS U/L 100   BILIRUBIN mg/dL 0.6   ALT (SGPT) U/L 9   AST (SGOT) U/L 12             Results from last 7 days   Lab Units 05/02/19  0447   LACTATE mmol/L 1.4             Estimated Creatinine Clearance: 76.9 mL/min (by C-G formula based on SCr of 0.77 mg/dL).      Microbiology:                                Radiology:  Imaging Results (last 72 hours)     Procedure Component Value Units Date/Time    XR Chest 1 View [[id number]] Collected:  05/01/19 2116     Updated:  05/01/19 2119    Narrative:       CR Chest 1 Vw    INDICATION:   Penicillin resistance staph infection in the right lower leg for the past couple of days. COPD. Cough and wheezing with fever for years.     COMPARISON:    8/17/2018    FINDINGS:  Single portable AP view of the chest.  Cardiac silhouette is borderline enlarged and stable. Elongation of the thoracic aorta. Underlying COPD. Vascularity is within normal limits. Lungs appear clear with exception of some probable linear atelectasis or  scarring in the left base. No pneumothorax.      Impression:       Chronic appearing lung changes. No definite superimposed active disease.    Signer Name: Mannie Ojeda MD   Signed: 5/1/2019 9:16 PM   Workstation Name: [email or web address]           5/1/2019 chest x-ray was independently reviewed    Impression:   Leuko-cytosis with neutrophilia improving  Fevers  Right lower extremity cellulitis  Tobacco abuse    PLAN/RECOMMENDATIONS:   Thank you for asking us to see Gladys Iglesias, I recommend the following:  Antibiotic have been broadened to vancomycin and Zosyn    Patient's white count is improving    Continue current antibiotic therapy vancomycin can be dosed by pharmacy would aim for a trough of 15    We will continue Zosyn for now but may consider changing to Rocephin soon    Elevate  extremity    May need compression stocking    Follow in hospital for 1-2 additional days         Juventino Santoyo MD  5/2/2019  2:34 PM

## 2019-05-03 VITALS
WEIGHT: 175 LBS | OXYGEN SATURATION: 99 % | HEIGHT: 61 IN | SYSTOLIC BLOOD PRESSURE: 146 MMHG | DIASTOLIC BLOOD PRESSURE: 85 MMHG | TEMPERATURE: 98 F | BODY MASS INDEX: 33.04 KG/M2 | HEART RATE: 86 BPM | RESPIRATION RATE: 18 BRPM

## 2019-05-03 LAB
GLUCOSE BLDC GLUCOMTR-MCNC: 103 MG/DL (ref 70–130)
GLUCOSE BLDC GLUCOMTR-MCNC: 153 MG/DL (ref 70–130)

## 2019-05-03 PROCEDURE — 25010000002 PIPERACILLIN SOD-TAZOBACTAM PER 1 G: Performed by: INTERNAL MEDICINE

## 2019-05-03 PROCEDURE — 82962 GLUCOSE BLOOD TEST: CPT

## 2019-05-03 PROCEDURE — 99239 HOSP IP/OBS DSCHRG MGMT >30: CPT | Performed by: NURSE PRACTITIONER

## 2019-05-03 PROCEDURE — 94799 UNLISTED PULMONARY SVC/PX: CPT

## 2019-05-03 PROCEDURE — 25010000002 DAPTOMYCIN PER 1 MG: Performed by: PHYSICIAN ASSISTANT

## 2019-05-03 PROCEDURE — 25010000002 HEPARIN (PORCINE) PER 1000 UNITS: Performed by: INTERNAL MEDICINE

## 2019-05-03 PROCEDURE — 25010000003 CEFTRIAXONE PER 250 MG: Performed by: PHYSICIAN ASSISTANT

## 2019-05-03 RX ORDER — CEFTRIAXONE SODIUM 2 G/50ML
2 INJECTION, SOLUTION INTRAVENOUS
Qty: 50 ML | Refills: 0
Start: 2019-05-03

## 2019-05-03 RX ORDER — CEFTRIAXONE SODIUM 2 G/50ML
2 INJECTION, SOLUTION INTRAVENOUS
Status: COMPLETED | OUTPATIENT
Start: 2019-05-03 | End: 2019-05-03

## 2019-05-03 RX ADMIN — SODIUM CHLORIDE 100 ML/HR: 9 INJECTION, SOLUTION INTRAVENOUS at 13:20

## 2019-05-03 RX ADMIN — IPRATROPIUM BROMIDE AND ALBUTEROL SULFATE 3 ML: 2.5; .5 SOLUTION RESPIRATORY (INHALATION) at 15:36

## 2019-05-03 RX ADMIN — BUDESONIDE AND FORMOTEROL FUMARATE DIHYDRATE 2 PUFF: 160; 4.5 AEROSOL RESPIRATORY (INHALATION) at 08:23

## 2019-05-03 RX ADMIN — SODIUM CHLORIDE, PRESERVATIVE FREE 3 ML: 5 INJECTION INTRAVENOUS at 10:31

## 2019-05-03 RX ADMIN — TAZOBACTAM SODIUM AND PIPERACILLIN SODIUM 3.38 G: 375; 3 INJECTION, SOLUTION INTRAVENOUS at 04:34

## 2019-05-03 RX ADMIN — HEPARIN SODIUM 5000 UNITS: 5000 INJECTION INTRAVENOUS; SUBCUTANEOUS at 06:03

## 2019-05-03 RX ADMIN — IPRATROPIUM BROMIDE 0.5 MG: 0.5 SOLUTION RESPIRATORY (INHALATION) at 08:22

## 2019-05-03 RX ADMIN — DAPTOMYCIN 350 MG: 500 INJECTION, POWDER, LYOPHILIZED, FOR SOLUTION INTRAVENOUS at 12:46

## 2019-05-03 RX ADMIN — IBUPROFEN 600 MG: 600 TABLET ORAL at 00:22

## 2019-05-03 RX ADMIN — ATORVASTATIN CALCIUM 20 MG: 20 TABLET, FILM COATED ORAL at 10:30

## 2019-05-03 RX ADMIN — LISINOPRIL 10 MG: 10 TABLET ORAL at 10:30

## 2019-05-03 RX ADMIN — NICOTINE 1 PATCH: 21 PATCH, EXTENDED RELEASE TRANSDERMAL at 10:29

## 2019-05-03 RX ADMIN — ASPIRIN 81 MG: 81 TABLET, COATED ORAL at 10:31

## 2019-05-03 RX ADMIN — CEFTRIAXONE SODIUM 2 G: 2 INJECTION, SOLUTION INTRAVENOUS at 13:29

## 2019-05-03 RX ADMIN — IPRATROPIUM BROMIDE 0.5 MG: 0.5 SOLUTION RESPIRATORY (INHALATION) at 13:32

## 2019-05-03 NOTE — PROGRESS NOTES
"[facility] Progress Note    Date of Admission: 2019      Antibiotics:  Vanc and Zosyn     CC:   Chief Complaint   Patient presents with   • Extremity Pain       S: No fever in 24 hours. No n/v/d. Tolerating antibiotics. Agreeable to daily infusion in our office. Wants home.     O:  /77 (BP Location: Left arm, Patient Position: Lying)   Pulse 88   Temp 97.5 °F (36.4 °C) (Oral)   Resp 20   Ht 154.9 cm (61\")   Wt 79.4 kg (175 lb)   SpO2 96%   BMI 33.07 kg/m²   Temp (24hrs), Av.9 °F (36.6 °C), Min:97.5 °F (36.4 °C), Max:98.1 °F (36.7 °C)      PE:   GENERAL: Awake and alert, in no acute distress.   HEENT: Normocephalic, atraumatic.  PERRL. EOMI. No conjunctival injection. No icterus. Oropharynx clear without evidence of thrush or exudate     HEART: RRR; No murmur, rubs, gallops.   LUNGS: Clear to auscultation bilaterally  ABDOMEN: Soft, nontender, nondistended.   EXT: Right leg with trace edema there is redness of the right calf extending distally over the right leg in the lower portion no ulceration of right leg; no open wound     MSK: FROM without joint effusions noted arms/legs.    SKIN: Warm and dry without cutaneous eruptions on Inspection/palpation.    NEURO: Oriented to PPT. No focal deficits on motor/sensory exam at arms/legs.  PSYCHIATRIC: Normal insight and judgement. Cooperative with PE      Laboratory Data    Results from last 7 days   Lab Units 19   WBC 10*3/mm3 10.94* 17.02*   HEMOGLOBIN g/dL 13.9 14.3   HEMATOCRIT % 45.2 45.9   PLATELETS 10*3/mm3 201 237     Results from last 7 days   Lab Units 19   SODIUM mmol/L 133*   POTASSIUM mmol/L 4.0   CHLORIDE mmol/L 99   CO2 mmol/L 21.0*   BUN mg/dL 16   CREATININE mg/dL 0.77   GLUCOSE mg/dL 176*   CALCIUM mg/dL 8.3*     Results from last 7 days   Lab Units 05/01/19  2117   ALK PHOS U/L 100   BILIRUBIN mg/dL 0.6   ALT (SGPT) U/L 9   AST (SGOT) U/L 12               Estimated Creatinine Clearance: 76.9 mL/min (by " C-G formula based on SCr of 0.77 mg/dL).      Microbiology:  Blood culture: NGSF x 2    Radiology:  Imaging Results (last 24 hours)     ** No results found for the last 24 hours. **          PROBLEM LIST:   RLE  Cellulitis  Leukocytosis  Fever  Tobacco abuse    ASSESSMENT:  Patient with improvement in RLE erythema but still with significant pain. Blood cultures remain negative. Currently on Vanc and Zosyn therapy with improving leukocytosis.    PLAN:  Change to single dose Dapto/Rocephin today and f/u appt with ID tomorrow at 0900 for short course infusion at [facility]  Continue leg elevation.       Dr. Juventino Santoyo saw the patient, performed the physical exam, reviewed the laboratory data and guided with the formulation of the above problem list, assessment and treatment plan.     I have seen and examined patient and agree with above  D/w [facility] office, hospitalist  Ok to to home today  F/u in office tomorrow at 0900 a.m.  Dp/cm time 20 minutes  Juventino Santoyo MD  5/3/2019

## 2019-05-03 NOTE — PROGRESS NOTES
Continued Stay Note  [facility]     Patient Name: Gladys Iglesias  MRN: [id number]  Today's Date: 5/3/2019    Admit Date: 5/1/2019    Discharge Plan     Row Name 05/03/19 1033       Plan    Plan  Plan is home with friends at discharge    Patient/Family in Agreement with Plan  yes    Plan Comments  Plan is home at discharge.  IV antibiotics will be needed at discharge.  Following for D/C needs     Final Discharge Disposition Code  01 - home or self-care        Discharge Codes    No documentation.             Viviana Cat RN

## 2019-05-03 NOTE — DISCHARGE SUMMARY
[facility] Medicine Services  DISCHARGE SUMMARY    Patient Name: Gladys Iglesias  : 1960  MRN: [id number]    Date of Admission: 2019  Date of Discharge:  5/3/2019  Primary Care Physician: Justina Vines MD    Consults     Date and Time Order Name Status Description    2019 0735 Inpatient Infectious Diseases Consult Completed           Hospital Course     Presenting Problem:   Cellulitis [L03.90]  Sepsis (CMS/HCC) [A41.9]    Active Hospital Problems    Diagnosis  POA   • Hyponatremia [E87.1]  Yes   • Cellulitis [L03.90]  Yes   • Sepsis (CMS/HCC) [A41.9]  Yes   • Smoker [F17.200]  Yes   • Hypertension [I10]  Yes   • Hyperlipidemia [E78.5]  Yes   • Diabetes mellitus (CMS/[facility]) [E11.9]  Yes   • Coronary artery disease [I25.10]  Yes   • COPD (chronic obstructive pulmonary disease) (CMS/[facility]) [J44.9]  Yes      Resolved Hospital Problems   No resolved problems to display.          Hospital Course:  Gladys Iglesias is a 58 y.o. female with a history of aortobifemoral bypass surgery at [facility] in 2016 who then developed subsequent incision site infection with the need for inpatient IV antibiotics and revision of her bypass for 6 weeks at .  She has had some recurrent cellulitis occasionally in the RLE since that time, with the most recent being in 2018.  She presented to [facility] with complaints of RLE pain and swelling as well as temps of up to 101.7 at home.  She reported these symptoms x 2 days.  She was admitted to hospital medicine services and placed on IV vancomycin and zosyn. She was seen in consultation by Dr. Santoyo with ID and continued on antibiotics.  Given her underlying bypass grafts, it was felt necessary to treat her for at least 2 weeks.  She has improved on IV antibiotics.  She is now medically stable and ready for discharge. She will follow up with Dr. Santoyo at [facility] tomorrow, where she will come daily for IV dapto and rocephin infusions at least for the  next week.  She will follow up with her PCP at their first available hospital follow up appt.        Discharge Follow Up Recommendations for labs/diagnostics:  Follow up with Dr. Santoyo tomorrow at [facility] for IV antibiotic infusion.      Day of Discharge     HPI:   Sitting upright in bed, feeling much improved. Ready to go home.    Review of Systems  Gen- No fevers, chills  CV- No chest pain, palpitations  Resp- No cough, dyspnea  GI- No N/V/D, abd pain      Otherwise ROS is negative except as mentioned in the HPI.    Vital Signs:   Temp:  [97.5 °F (36.4 °C)-98.1 °F (36.7 °C)] 98 °F (36.7 °C)  Heart Rate:  [] 89  Resp:  [16-20] 18  BP: (131-146)/(72-85) 146/85     Physical Exam:  Constitutional: No acute distress, awake, alert  HENT: NCAT, mucous membranes moist  Respiratory: Clear to auscultation bilaterally, respiratory effort normal   Cardiovascular: RRR, no murmurs, rubs, or gallops, palpable pedal pulses bilaterally  Gastrointestinal: Positive bowel sounds, soft, nontender, nondistended  Musculoskeletal: trace edema to right lower leg  Psychiatric: Appropriate affect, cooperative  Neurologic: Oriented x 3, strength symmetric in all extremities, Cranial Nerves grossly intact to confrontation, speech clear  Skin: No rashes to exposed skin, erythema of right calf, extending distally over the lower leg.       Pertinent  and/or Most Recent Results     Results from last 7 days   Lab Units 05/02/19  0447 05/01/19  2117   WBC 10*3/mm3 10.94* 17.02*   HEMOGLOBIN g/dL 13.9 14.3   HEMATOCRIT % 45.2 45.9   PLATELETS 10*3/mm3 201 237   SODIUM mmol/L 133* 132*   POTASSIUM mmol/L 4.0 4.1   CHLORIDE mmol/L 99 95*   CO2 mmol/L 21.0* 25.0   BUN mg/dL 16 17   CREATININE mg/dL 0.77 0.78   GLUCOSE mg/dL 176* 111*   CALCIUM mg/dL 8.3* 8.6     Results from last 7 days   Lab Units 05/01/19  2117   BILIRUBIN mg/dL 0.6   ALK PHOS U/L 100   ALT (SGPT) U/L 9   AST (SGOT) U/L 12           Invalid input(s): TG, LDLCALC, LDLREALC         Brief Urine Lab Results     None          Microbiology Results Abnormal     Procedure Component Value - Date/Time    Blood Culture - Blood, Arm, Left [[id number]] Collected:  05/01/19 2115    Lab Status:  Preliminary result Specimen:  Blood from Arm, Left Updated:  05/02/19 2131     Blood Culture No growth at 24 hours    Blood Culture - Blood, Arm, Right [[id number]] Collected:  05/01/19 2105    Lab Status:  Preliminary result Specimen:  Blood from Arm, Right Updated:  05/02/19 2131     Blood Culture No growth at 24 hours          Imaging Results (all)     Procedure Component Value Units Date/Time    XR Chest 1 View [[id number]] Collected:  05/01/19 2116     Updated:  05/01/19 2119    Narrative:       CR Chest 1 Vw    INDICATION:   Penicillin resistance staph infection in the right lower leg for the past couple of days. COPD. Cough and wheezing with fever for years.     COMPARISON:    8/17/2018    FINDINGS:  Single portable AP view of the chest.  Cardiac silhouette is borderline enlarged and stable. Elongation of the thoracic aorta. Underlying COPD. Vascularity is within normal limits. Lungs appear clear with exception of some probable linear atelectasis or  scarring in the left base. No pneumothorax.      Impression:       Chronic appearing lung changes. No definite superimposed active disease.    Signer Name: Mannie Ojeda MD   Signed: 5/1/2019 9:16 PM   Workstation Name: [facility]             Results for orders placed during the hospital encounter of 08/15/18   Duplex Venous Lower Extremity - Right CAR    Narrative · Normal right lower extremity venous duplex scan.  · Incidental moderate right inguinal adenopathy noted.          Results for orders placed during the hospital encounter of 08/15/18   Duplex Venous Lower Extremity - Right CAR    Narrative · Normal right lower extremity venous duplex scan.  · Incidental moderate right inguinal adenopathy noted.                Order Current Status    Blood Culture  - Blood, Arm, Left Preliminary result    Blood Culture - Blood, Arm, Right Preliminary result        Discharge Details        Discharge Medications      New Medications      Instructions Start Date   cefTRIAXone 40 MG/ML IVPB  Commonly known as:  ROCEPHIN   2 g, Intravenous, Every 24 Hours Scheduled      DAPTOmycin 350 mg in sodium chloride 0.9 % 50 mL   6 mg/kg, Intravenous, Every 24 Hours         Continue These Medications      Instructions Start Date   albuterol (2.5 MG/3ML) 0.083% nebulizer solution  Commonly known as:  PROVENTIL   2.5 mg, Nebulization, Every 4 Hours PRN      aspirin 81 MG EC tablet   81 mg, Oral, Daily      atorvastatin 20 MG tablet  Commonly known as:  LIPITOR   20 mg, Oral, Daily      budesonide-formoterol 160-4.5 MCG/ACT inhaler  Commonly known as:  SYMBICORT   2 puffs, Inhalation, 2 Times Daily - RT      ipratropium-albuterol 0.5-2.5 mg/3 ml nebulizer  Commonly known as:  DUO-NEB   3 mL, Nebulization, Every 4 Hours PRN      lisinopril 10 MG tablet  Commonly known as:  PRINIVIL,ZESTRIL   10 mg, Oral, Daily      metFORMIN 500 MG tablet  Commonly known as:  GLUCOPHAGE   500 mg, Oral, Daily      tiotropium 18 MCG per inhalation capsule  Commonly known as:  SPIRIVA   1 capsule, Inhalation, Daily - RT             Allergies   Allergen Reactions   • Propoxyphene-Acetaminophen Nausea And Vomiting         Discharge Disposition:  Home or Self Care    Discharge Diet:  Diet Order   Procedures   • Diet Regular; Consistent Carbohydrate         Discharge Activity:   Activity Instructions     Activity as Tolerated              CODE STATUS:    Code Status and Medical Interventions:   Ordered at: 05/02/19 0009     Code Status:    CPR     Medical Interventions (Level of Support Prior to Arrest):    Full         No future appointments.    Additional Instructions for the Follow-ups that You Need to Schedule     Discharge Follow-up with PCP   As directed       Currently Documented PCP:    Justina Vines MD     PCP Phone Number:    [phone]     Follow Up Details:  first available hospital follow up appt.         Discharge Follow-up with Specified Provider: Dr. Santoyo   As directed      To:  Dr. Santoyo    Follow Up Details:  tomorrow- should already be scheduled               Time Spent on Discharge:  40 minutes    Electronically signed by ISA Carias, 05/03/19, 11:26 AM.

## 2019-05-04 ENCOUNTER — READMISSION MANAGEMENT (OUTPATIENT)
Dept: CALL CENTER | Facility: HOSPITAL | Age: 59
End: 2019-05-04

## 2019-05-04 NOTE — OUTREACH NOTE
Prep Survey      Responses   Facility patient discharged from?  [place]   Is patient eligible?  Yes   Discharge diagnosis  Cellulitis,    Sepsis   Does the patient have one of the following disease processes/diagnoses(primary or secondary)?  Sepsis   Does the patient have Home health ordered?  No   Is there a DME ordered?  No   Medication alerts for this patient  daily antibiotic infusion at [facility]   Prep survey completed?  Yes          Heidy Lopez RN

## 2019-05-05 ENCOUNTER — READMISSION MANAGEMENT (OUTPATIENT)
Dept: CALL CENTER | Facility: HOSPITAL | Age: 59
End: 2019-05-05

## 2019-05-05 NOTE — OUTREACH NOTE
Sepsis Week 1 Survey      Responses   Facility patient discharged from?  [place]   Does the patient have one of the following disease processes/diagnoses(primary or secondary)?  Sepsis   Is there a successful TCM telephone encounter documented?  No   Week 1 attempt successful?  No   Unsuccessful attempts  Attempt 1          Jackelyn Rodney RN

## 2019-05-06 ENCOUNTER — LAB (OUTPATIENT)
Dept: LAB | Facility: HOSPITAL | Age: 59
End: 2019-05-06

## 2019-05-06 ENCOUNTER — TRANSCRIBE ORDERS (OUTPATIENT)
Dept: LAB | Facility: HOSPITAL | Age: 59
End: 2019-05-06

## 2019-05-06 DIAGNOSIS — L03.115 CELLULITIS OF RIGHT FOOT: ICD-10-CM

## 2019-05-06 DIAGNOSIS — L03.115 CELLULITIS OF RIGHT FOOT: Primary | ICD-10-CM

## 2019-05-06 LAB
ALBUMIN SERPL-MCNC: 3.4 G/DL (ref 3.5–5.2)
ALBUMIN/GLOB SERPL: 0.9 G/DL
ALP SERPL-CCNC: 99 U/L (ref 39–117)
ALT SERPL W P-5'-P-CCNC: 12 U/L (ref 1–33)
ANION GAP SERPL CALCULATED.3IONS-SCNC: 12 MMOL/L
AST SERPL-CCNC: 12 U/L (ref 1–32)
BACTERIA SPEC AEROBE CULT: NORMAL
BACTERIA SPEC AEROBE CULT: NORMAL
BASOPHILS # BLD AUTO: 0.03 10*3/MM3 (ref 0–0.2)
BASOPHILS NFR BLD AUTO: 0.3 % (ref 0–1.5)
BILIRUB SERPL-MCNC: 0.2 MG/DL (ref 0.2–1.2)
BUN BLD-MCNC: 9 MG/DL (ref 6–20)
BUN/CREAT SERPL: 13.8 (ref 7–25)
CALCIUM SPEC-SCNC: 8.6 MG/DL (ref 8.6–10.5)
CHLORIDE SERPL-SCNC: 102 MMOL/L (ref 98–107)
CK SERPL-CCNC: 44 U/L (ref 20–180)
CO2 SERPL-SCNC: 27 MMOL/L (ref 22–29)
CREAT BLD-MCNC: 0.65 MG/DL (ref 0.57–1)
CRP SERPL-MCNC: 4.2 MG/DL (ref 0–0.5)
DEPRECATED RDW RBC AUTO: 45 FL (ref 37–54)
EOSINOPHIL # BLD AUTO: 0.36 10*3/MM3 (ref 0–0.4)
EOSINOPHIL NFR BLD AUTO: 3.9 % (ref 0.3–6.2)
ERYTHROCYTE [DISTWIDTH] IN BLOOD BY AUTOMATED COUNT: 14.6 % (ref 12.3–15.4)
ERYTHROCYTE [SEDIMENTATION RATE] IN BLOOD: 57 MM/HR (ref 0–30)
GFR SERPL CREATININE-BSD FRML MDRD: 94 ML/MIN/1.73
GLOBULIN UR ELPH-MCNC: 3.7 GM/DL
GLUCOSE BLD-MCNC: 136 MG/DL (ref 65–99)
HCT VFR BLD AUTO: 44.6 % (ref 34–46.6)
HGB BLD-MCNC: 14 G/DL (ref 12–15.9)
IMM GRANULOCYTES # BLD AUTO: 0.02 10*3/MM3 (ref 0–0.05)
IMM GRANULOCYTES NFR BLD AUTO: 0.2 % (ref 0–0.5)
LYMPHOCYTES # BLD AUTO: 2.3 10*3/MM3 (ref 0.7–3.1)
LYMPHOCYTES NFR BLD AUTO: 25.2 % (ref 19.6–45.3)
MCH RBC QN AUTO: 26.3 PG (ref 26.6–33)
MCHC RBC AUTO-ENTMCNC: 31.4 G/DL (ref 31.5–35.7)
MCV RBC AUTO: 83.8 FL (ref 79–97)
MONOCYTES # BLD AUTO: 0.57 10*3/MM3 (ref 0.1–0.9)
MONOCYTES NFR BLD AUTO: 6.3 % (ref 5–12)
NEUTROPHILS # BLD AUTO: 5.84 10*3/MM3 (ref 1.7–7)
NEUTROPHILS NFR BLD AUTO: 64.1 % (ref 42.7–76)
PLATELET # BLD AUTO: 281 10*3/MM3 (ref 140–450)
PMV BLD AUTO: 9.2 FL (ref 6–12)
POTASSIUM BLD-SCNC: 4.5 MMOL/L (ref 3.5–5.2)
PROT SERPL-MCNC: 7.1 G/DL (ref 6–8.5)
RBC # BLD AUTO: 5.32 10*6/MM3 (ref 3.77–5.28)
SODIUM BLD-SCNC: 141 MMOL/L (ref 136–145)
WBC NRBC COR # BLD: 9.12 10*3/MM3 (ref 3.4–10.8)

## 2019-05-06 PROCEDURE — 86140 C-REACTIVE PROTEIN: CPT

## 2019-05-06 PROCEDURE — 80053 COMPREHEN METABOLIC PANEL: CPT

## 2019-05-06 PROCEDURE — 82550 ASSAY OF CK (CPK): CPT | Performed by: INTERNAL MEDICINE

## 2019-05-06 PROCEDURE — 36415 COLL VENOUS BLD VENIPUNCTURE: CPT

## 2019-05-06 PROCEDURE — 85025 COMPLETE CBC W/AUTO DIFF WBC: CPT

## 2019-05-06 PROCEDURE — 85652 RBC SED RATE AUTOMATED: CPT

## 2019-05-08 ENCOUNTER — READMISSION MANAGEMENT (OUTPATIENT)
Dept: CALL CENTER | Facility: HOSPITAL | Age: 59
End: 2019-05-08

## 2019-05-08 NOTE — OUTREACH NOTE
Sepsis Week 1 Survey      Responses   Facility patient discharged from?  [place]   Does the patient have one of the following disease processes/diagnoses(primary or secondary)?  Sepsis   Is there a successful TCM telephone encounter documented?  No   Week 1 attempt successful?  Yes   Call start time  1222   Call end time  1224   Discharge diagnosis  Cellulitis,    Sepsis   Medication alerts for this patient  po antibiotics   Meds reviewed with patient/caregiver?  Yes   Is the patient having any side effects they believe may be caused by any medication additions or changes?  No   Does the patient have all medications related to this admission filled (includes all antibiotics, inhalers, nebulizers,steroids,etc.)  Yes   Is the patient taking all medications as directed (includes completed medication regime)?  Yes   Does the patient have a primary care provider?   Yes   Does the patient have an appointment with their PCP within 7 days of discharge?  Yes   Has the patient kept scheduled appointments due by today?  Yes   Psychosocial issues?  No   Did the patient receive a copy of their discharge instructions?  Yes   Nursing interventions  Reviewed instructions with patient   What is the patient's perception of their health status since discharge?  Improving   Nursing interventions  Nurse provided patient education   Is the patient/caregiver able to teach back Sepsis?  S - Shivering,fever or very cold, E - Extreme pain or generalized discomfort (worst ever,especially abdomen), P - Pale or discolored skin, S - Sleepy, difficult to arouse,confused, I -   I feel like I might die-a feeling of hopelessness, S - Short of breath   Nursing interventions  Nurse provided reassurance to patient   Is patient/caregiver able to teach back steps to recovery at home?  Set small, achievable goals for return to baseline health, Make a list of questions for PCP appoinment   Is the patient/caregiver able to teach back signs and symptoms of  worsening condition:  Fever, Altered mental status(confusion/coma), Rapid heart rate (>90), High blood glucose without diabetes, Edema, Shortness of breath/rapid respiratory rate   Is the patient/caregiver able to teach back the hierarchy of who to call/visit for symptoms/problems? PCP, Specialist, Home health nurse, Urgent Care, ED, 911  Yes   Week 1 call completed?  Yes          Sharon Valdovinos RN

## 2019-05-15 ENCOUNTER — READMISSION MANAGEMENT (OUTPATIENT)
Dept: CALL CENTER | Facility: HOSPITAL | Age: 59
End: 2019-05-15

## 2019-05-15 NOTE — OUTREACH NOTE
Sepsis Week 2 Survey      Responses   Facility patient discharged from?  [place]   Does the patient have one of the following disease processes/diagnoses(primary or secondary)?  Sepsis   Week 2 attempt successful?  No   Unsuccessful attempts  Attempt 1          Vira Barney RN

## 2019-05-16 ENCOUNTER — READMISSION MANAGEMENT (OUTPATIENT)
Dept: CALL CENTER | Facility: HOSPITAL | Age: 59
End: 2019-05-16

## 2019-05-16 NOTE — OUTREACH NOTE
Sepsis Week 2 Survey      Responses   Facility patient discharged from?  [place]   Does the patient have one of the following disease processes/diagnoses(primary or secondary)?  Sepsis   Week 2 attempt successful?  No   Unsuccessful attempts  Attempt 2          Clayton Sutherland RN

## 2019-05-29 ENCOUNTER — READMISSION MANAGEMENT (OUTPATIENT)
Dept: CALL CENTER | Facility: HOSPITAL | Age: 59
End: 2019-05-29

## 2019-05-29 NOTE — OUTREACH NOTE
Sepsis Week 3 Survey      Responses   Facility patient discharged from?  [place]   Does the patient have one of the following disease processes/diagnoses(primary or secondary)?  Sepsis   Week 3 attempt successful?  No   Unsuccessful attempts  Attempt 1          Samantha Gates RN

## 2019-05-31 ENCOUNTER — READMISSION MANAGEMENT (OUTPATIENT)
Dept: CALL CENTER | Facility: HOSPITAL | Age: 59
End: 2019-05-31

## 2019-05-31 NOTE — OUTREACH NOTE
Sepsis Week 3 Survey      Responses   Facility patient discharged from?  [place]   Does the patient have one of the following disease processes/diagnoses(primary or secondary)?  Sepsis   Week 3 attempt successful?  Yes   Call start time  1551   Call end time  1555   Meds reviewed with patient/caregiver?  Yes   Is the patient having any side effects they believe may be caused by any medication additions or changes?  No   Does the patient have all medications related to this admission filled (includes all antibiotics, inhalers, nebulizers,steroids,etc.)  Yes   Is the patient taking all medications as directed (includes completed medication regime)?  Yes   Medication comments  All antibiotics completed 5/31   Has the patient kept scheduled appointments due by today?  Yes   Comments  5/31 States noticed leg was increased pink color about three days ago, not changed.   What is the patient's perception of their health status since discharge?  Worsening [Possible symptoms starting again, is monitoring and will call MD if changes]   Week 3 call completed?  Yes          Marilu Cassidy RN

## 2019-06-08 ENCOUNTER — READMISSION MANAGEMENT (OUTPATIENT)
Dept: CALL CENTER | Facility: HOSPITAL | Age: 59
End: 2019-06-08

## 2019-06-08 NOTE — OUTREACH NOTE
Sepsis Week 4 Survey      Responses   Facility patient discharged from?  [place]   Does the patient have one of the following disease processes/diagnoses(primary or secondary)?  Sepsis   Week 4 attempt successful?  Yes   Call start time  1410   Call end time  1417   Discharge diagnosis  Cellulitis,    Sepsis   Meds reviewed with patient/caregiver?  Yes   Is the patient having any side effects they believe may be caused by any medication additions or changes?  No   Is the patient taking all medications as directed (includes completed medication regime)?  Yes   Medication comments  antibiotics complete.   Has the patient kept scheduled appointments due by today?  Yes   Comments  Pt will see PCP this week.     Is the patient still receiving Home Health Services?  N/A   Psychosocial issues?  No   Comments  Pt state legs is slightly pink but denies any other symptoms.   What is the patient's perception of their health status since discharge?  Improving   Nursing interventions  Nurse provided patient education   Is patient/caregiver able to teach back steps to recovery at home?  Set small, achievable goals for return to baseline health, Rest and regain strength, Talk about feelings with family/friends, Eat a balanced diet, Make a list of questions for PCP appoinment   Is the patient/caregiver able to teach back the hierarchy of who to call/visit for symptoms/problems? PCP, Specialist, Home health nurse, Urgent Care, ED, 911  Yes   Week 4 call completed?  Yes   Would the patient like one additional call?  No   Graduated  Yes   Did the patient feel the follow up calls were helpful during their recovery period?  Yes   Was the number of calls appropriate?  Yes          Shahrzad Lebron RN